# Patient Record
Sex: FEMALE | NOT HISPANIC OR LATINO | Employment: FULL TIME | URBAN - METROPOLITAN AREA
[De-identification: names, ages, dates, MRNs, and addresses within clinical notes are randomized per-mention and may not be internally consistent; named-entity substitution may affect disease eponyms.]

---

## 2024-04-22 ENCOUNTER — OFFICE VISIT (OUTPATIENT)
Dept: URGENT CARE | Facility: URGENT CARE | Age: 24
End: 2024-04-22

## 2024-04-22 VITALS
HEART RATE: 114 BPM | WEIGHT: 110.3 LBS | SYSTOLIC BLOOD PRESSURE: 110 MMHG | TEMPERATURE: 98.4 F | OXYGEN SATURATION: 100 % | DIASTOLIC BLOOD PRESSURE: 67 MMHG

## 2024-04-22 DIAGNOSIS — R10.31 ABDOMINAL PAIN, RIGHT LOWER QUADRANT: Primary | ICD-10-CM

## 2024-04-22 PROCEDURE — 99204 OFFICE O/P NEW MOD 45 MIN: CPT | Performed by: FAMILY MEDICINE

## 2024-05-14 ENCOUNTER — OFFICE VISIT (OUTPATIENT)
Dept: PEDIATRICS | Facility: CLINIC | Age: 24
End: 2024-05-14

## 2024-05-14 VITALS
WEIGHT: 110.3 LBS | TEMPERATURE: 98.5 F | BODY MASS INDEX: 20.3 KG/M2 | HEART RATE: 93 BPM | HEIGHT: 62 IN | RESPIRATION RATE: 16 BRPM | SYSTOLIC BLOOD PRESSURE: 95 MMHG | OXYGEN SATURATION: 98 % | DIASTOLIC BLOOD PRESSURE: 61 MMHG

## 2024-05-14 DIAGNOSIS — Z86.19 HISTORY OF HELICOBACTER PYLORI INFECTION: ICD-10-CM

## 2024-05-14 DIAGNOSIS — K55.1 SUPERIOR MESENTERIC ARTERY SYNDROME (H): ICD-10-CM

## 2024-05-14 DIAGNOSIS — Z09 HOSPITAL DISCHARGE FOLLOW-UP: Primary | ICD-10-CM

## 2024-05-14 PROCEDURE — 99214 OFFICE O/P EST MOD 30 MIN: CPT | Performed by: NURSE PRACTITIONER

## 2024-05-14 PROCEDURE — G2211 COMPLEX E/M VISIT ADD ON: HCPCS | Performed by: NURSE PRACTITIONER

## 2024-05-14 ASSESSMENT — PAIN SCALES - GENERAL: PAINLEVEL: SEVERE PAIN (7)

## 2024-05-14 NOTE — PROGRESS NOTES
Assessment & Plan     Hospital discharge follow-up  Superior mesenteric artery syndrome (H24)  History of Helicobacter pylori infection  Symptoms improved however still having pressure.  Continue with liquid diet and advance foods as tolerated.  Can refer to nutrition if needed.  Completed therapy for H.Pylori.  Started on omeprazole as patient felt better when being treated for H.Pylori.  Keep follow-up appointment with [facility] for 6/26/2024.  Work note given.  ER precautions given  - omeprazole (PRILOSEC) 20 MG DR capsule; Take 1 capsule (20 mg) by mouth 2 times daily      MED REC REQUIRED  Post Medication Reconciliation Status: discharge medications reconciled and changed, per note/orders      Subjective   Jael is a 23 year old, presenting for the following health issues:  Hospital F/U (Urinary retention )      5/14/2024     2:30 PM   Additional Questions   Roomed by Samira GALEANA   Accompanied by Self         5/14/2024     2:30 PM   Patient Reported Additional Medications   Patient reports taking the following new medications No     HPI     Hospital Follow-up Visit:    Hospital/Nursing Home/IP Rehab Facility: [facility].  Date of Admission: 4/22/24  Date of Discharge: 4/26/24  Reason(s) for Admission: Urinary retention   Superior mesenteric artery syndrome (HC) (Primary Dx);   Nausea and vomiting, unspecified vomiting type;   Diarrhea, unspecified type;   Abdominal pain, unspecified abdominal location;   Helicobacter pylori infection;   SMAS (superior mesenteric artery syndrome)   Was the patient in the ICU or did the patient experience delirium during hospitalization?  No  Do you have any other stressors you would like to discuss with your provider? No  Problems taking medications regularly:  None  Medication changes since discharge: Yes  Problems adhering to non-medication therapy:  None    Summary of hospitalization:  CareEverywhere information obtained and reviewed  Diagnostic  "Tests/Treatments reviewed.    Follow up needed: PCP follow-up and [facility] follow-up schedule for June 26th.  Other Healthcare Providers Involved in Patient s Care:         None  Update since discharge: improved.   Plan of care communicated with patient       Completed treatment for H Pylori. Patient was feeling better while on medications.    Still feels a lot of pressure in abdomen and \"bubbles\" intermittently.  This happens mostly when she is active but can happen at rest. States when she passes gas she can also feel stomach pain. Has only been consuming a liquid diet.  Felt better when she was on medication for H. Pylori.      Needs a work note.  Has been missing work due to illness.    Review of Systems  Constitutional, HEENT, cardiovascular, pulmonary, gi and gu systems are negative, except as otherwise noted.      Objective    BP 95/61 (BP Location: Right arm, Patient Position: Sitting, Cuff Size: Adult Regular)   Pulse 93   Temp 98.5  F (36.9  C) (Tympanic)   Resp 16   Ht 1.581 m (5' 2.24\")   Wt 50 kg (110 lb 4.8 oz)   LMP 04/20/2024 (Approximate)   SpO2 98%   BMI 20.02 kg/m    Body mass index is 20.02 kg/m .    Physical Exam   GENERAL: alert and no distress  RESP: lungs clear to auscultation - no rales, rhonchi or wheezes  CV: regular rate and rhythm, normal S1 S2, no S3 or S4, no murmur, click or rub, no peripheral edema  ABDOMEN: tenderness epigastric and RUQ and bowel sounds normal  PSYCH: mentation appears normal, affect normal/bright          Signed Electronically by: Dasia Travis NP    "

## 2024-05-14 NOTE — LETTER
May 14, 2024      Jael Boateng  [address]  [facility] [address]        To Whom It May Concern:    Jael Boateng was seen in our clinic.  Please excuse her from work until Monday, July 1, 2024.       Sincerely,      Dasia Travis

## 2024-05-14 NOTE — PATIENT INSTRUCTIONS
It was nice seeing you today.    [facility]  Central Scheduling Phone: [phone]  Estelle: [phone]  [address]  [address]Clinic) / [address] ([facility])  [place] [id number]      Please let me know if you have any questions regarding today's visit!    Take care,    TENISHA Travis, LAVON  Family Medicine

## 2024-05-17 ENCOUNTER — TRANSFERRED RECORDS (OUTPATIENT)
Dept: HEALTH INFORMATION MANAGEMENT | Facility: CLINIC | Age: 24
End: 2024-05-17

## 2024-06-10 ENCOUNTER — TELEPHONE (OUTPATIENT)
Dept: PEDIATRICS | Facility: CLINIC | Age: 24
End: 2024-06-10

## 2024-06-10 NOTE — TELEPHONE ENCOUNTER
Forms/Letter Request    Type of form/letter: OTHER: [facility] INSURANCE       Do we have the form/letter: Yes:     Who is the form from? Patient    Where did/will the form come from? Patient or family brought in       When is form/letter needed by: 06/11/24    How would you like the form/letter returned:     Patient Notified form requests are processed in 5-7 business days:Yes    Okay to leave a detailed message?: Yes at Cell number on file:    Telephone Information:   Mobile [phone]      Form placed in provider's folder     Katalina Rico on 6/10/2024 at 3:22 PM

## 2024-06-12 NOTE — TELEPHONE ENCOUNTER
Forms found and completed by provider. Copy sent to abstract. Pt given form in clinic.      Kimberlee GALEANA CMA

## 2024-06-26 ENCOUNTER — TRANSFERRED RECORDS (OUTPATIENT)
Dept: HEALTH INFORMATION MANAGEMENT | Facility: CLINIC | Age: 24
End: 2024-06-26

## 2024-07-28 ENCOUNTER — HEALTH MAINTENANCE LETTER (OUTPATIENT)
Age: 24
End: 2024-07-28

## 2024-09-12 ENCOUNTER — APPOINTMENT (OUTPATIENT)
Dept: CT IMAGING | Facility: CLINIC | Age: 24
End: 2024-09-12
Attending: EMERGENCY MEDICINE

## 2024-09-12 ENCOUNTER — HOSPITAL ENCOUNTER (OUTPATIENT)
Facility: CLINIC | Age: 24
Setting detail: OBSERVATION
Discharge: HOME | End: 2024-09-13
Attending: EMERGENCY MEDICINE | Admitting: HOSPITALIST
Payer: COMMERCIAL

## 2024-09-12 DIAGNOSIS — R11.2 CANNABINOID HYPEREMESIS SYNDROME: Primary | ICD-10-CM

## 2024-09-12 DIAGNOSIS — E87.6 HYPOKALEMIA: ICD-10-CM

## 2024-09-12 DIAGNOSIS — K21.9 GASTROESOPHAGEAL REFLUX DISEASE WITHOUT ESOPHAGITIS: ICD-10-CM

## 2024-09-12 DIAGNOSIS — K59.03 DRUG-INDUCED CONSTIPATION: ICD-10-CM

## 2024-09-12 DIAGNOSIS — F12.90 CANNABINOID HYPEREMESIS SYNDROME: Primary | ICD-10-CM

## 2024-09-12 DIAGNOSIS — R11.2 NAUSEA AND VOMITING, UNSPECIFIED VOMITING TYPE: ICD-10-CM

## 2024-09-12 LAB
ALBUMIN SERPL BCG-MCNC: 4.7 G/DL (ref 3.5–5.2)
ALP SERPL-CCNC: 53 U/L (ref 40–150)
ALT SERPL W P-5'-P-CCNC: 19 U/L (ref 0–50)
AMPHETAMINES UR QL SCN: ABNORMAL
ANION GAP SERPL CALCULATED.3IONS-SCNC: 17 MMOL/L (ref 7–15)
AST SERPL W P-5'-P-CCNC: 18 U/L (ref 0–45)
ATRIAL RATE - MUSE: 88 BPM
BARBITURATES UR QL SCN: ABNORMAL
BASOPHILS # BLD AUTO: 0 10E3/UL (ref 0–0.2)
BASOPHILS NFR BLD AUTO: 0 %
BENZODIAZ UR QL SCN: ABNORMAL
BILIRUB SERPL-MCNC: 0.6 MG/DL
BUN SERPL-MCNC: 6.4 MG/DL (ref 6–20)
BZE UR QL SCN: ABNORMAL
CALCIUM SERPL-MCNC: 9.4 MG/DL (ref 8.8–10.4)
CANNABINOIDS UR QL SCN: ABNORMAL
CHLORIDE SERPL-SCNC: 94 MMOL/L (ref 98–107)
CREAT SERPL-MCNC: 0.69 MG/DL (ref 0.51–0.95)
DIASTOLIC BLOOD PRESSURE - MUSE: NORMAL MMHG
EGFRCR SERPLBLD CKD-EPI 2021: >90 ML/MIN/1.73M2
EOSINOPHIL # BLD AUTO: 0 10E3/UL (ref 0–0.7)
EOSINOPHIL NFR BLD AUTO: 0 %
ERYTHROCYTE [DISTWIDTH] IN BLOOD BY AUTOMATED COUNT: 11.8 % (ref 10–15)
FENTANYL UR QL: ABNORMAL
GLUCOSE SERPL-MCNC: 113 MG/DL (ref 70–99)
HCG SERPL QL: NEGATIVE
HCO3 SERPL-SCNC: 23 MMOL/L (ref 22–29)
HCT VFR BLD AUTO: 41.4 % (ref 35–47)
HGB BLD-MCNC: 14.8 G/DL (ref 11.7–15.7)
HOLD SPECIMEN: NORMAL
IMM GRANULOCYTES # BLD: 0 10E3/UL
IMM GRANULOCYTES NFR BLD: 0 %
INTERPRETATION ECG - MUSE: NORMAL
LYMPHOCYTES # BLD AUTO: 1.5 10E3/UL (ref 0.8–5.3)
LYMPHOCYTES NFR BLD AUTO: 26 %
MAGNESIUM SERPL-MCNC: 2 MG/DL (ref 1.7–2.3)
MCH RBC QN AUTO: 30 PG (ref 26.5–33)
MCHC RBC AUTO-ENTMCNC: 35.7 G/DL (ref 31.5–36.5)
MCV RBC AUTO: 84 FL (ref 78–100)
MONOCYTES # BLD AUTO: 0.4 10E3/UL (ref 0–1.3)
MONOCYTES NFR BLD AUTO: 7 %
NEUTROPHILS # BLD AUTO: 3.8 10E3/UL (ref 1.6–8.3)
NEUTROPHILS NFR BLD AUTO: 67 %
NRBC # BLD AUTO: 0 10E3/UL
NRBC BLD AUTO-RTO: 0 /100
OPIATES UR QL SCN: ABNORMAL
P AXIS - MUSE: 71 DEGREES
PCP QUAL URINE (ROCHE): ABNORMAL
PHOSPHATE SERPL-MCNC: 1.1 MG/DL (ref 2.5–4.5)
PHOSPHATE SERPL-MCNC: 3 MG/DL (ref 2.5–4.5)
PLATELET # BLD AUTO: 158 10E3/UL (ref 150–450)
POTASSIUM SERPL-SCNC: 2.4 MMOL/L (ref 3.4–5.3)
POTASSIUM SERPL-SCNC: 3.2 MMOL/L (ref 3.4–5.3)
POTASSIUM SERPL-SCNC: 3.5 MMOL/L (ref 3.4–5.3)
PR INTERVAL - MUSE: 160 MS
PROT SERPL-MCNC: 7.9 G/DL (ref 6.4–8.3)
QRS DURATION - MUSE: 68 MS
QT - MUSE: 388 MS
QTC - MUSE: 469 MS
R AXIS - MUSE: 56 DEGREES
RBC # BLD AUTO: 4.94 10E6/UL (ref 3.8–5.2)
SODIUM SERPL-SCNC: 134 MMOL/L (ref 135–145)
SYSTOLIC BLOOD PRESSURE - MUSE: NORMAL MMHG
T AXIS - MUSE: 46 DEGREES
VENTRICULAR RATE- MUSE: 88 BPM
WBC # BLD AUTO: 5.8 10E3/UL (ref 4–11)

## 2024-09-12 PROCEDURE — 96376 TX/PRO/DX INJ SAME DRUG ADON: CPT | Mod: 59

## 2024-09-12 PROCEDURE — 250N000011 HC RX IP 250 OP 636: Performed by: EMERGENCY MEDICINE

## 2024-09-12 PROCEDURE — 74174 CTA ABD&PLVS W/CONTRAST: CPT

## 2024-09-12 PROCEDURE — 99223 1ST HOSP IP/OBS HIGH 75: CPT | Performed by: HOSPITALIST

## 2024-09-12 PROCEDURE — G0378 HOSPITAL OBSERVATION PER HR: HCPCS

## 2024-09-12 PROCEDURE — 96375 TX/PRO/DX INJ NEW DRUG ADDON: CPT | Mod: 59

## 2024-09-12 PROCEDURE — 84703 CHORIONIC GONADOTROPIN ASSAY: CPT | Performed by: EMERGENCY MEDICINE

## 2024-09-12 PROCEDURE — 36415 COLL VENOUS BLD VENIPUNCTURE: CPT | Performed by: HOSPITALIST

## 2024-09-12 PROCEDURE — 99285 EMERGENCY DEPT VISIT HI MDM: CPT | Mod: 25

## 2024-09-12 PROCEDURE — 80053 COMPREHEN METABOLIC PANEL: CPT | Performed by: EMERGENCY MEDICINE

## 2024-09-12 PROCEDURE — 250N000009 HC RX 250: Performed by: EMERGENCY MEDICINE

## 2024-09-12 PROCEDURE — 84132 ASSAY OF SERUM POTASSIUM: CPT | Performed by: HOSPITALIST

## 2024-09-12 PROCEDURE — 36415 COLL VENOUS BLD VENIPUNCTURE: CPT | Performed by: EMERGENCY MEDICINE

## 2024-09-12 PROCEDURE — 84100 ASSAY OF PHOSPHORUS: CPT | Performed by: HOSPITALIST

## 2024-09-12 PROCEDURE — 83735 ASSAY OF MAGNESIUM: CPT | Performed by: EMERGENCY MEDICINE

## 2024-09-12 PROCEDURE — 250N000013 HC RX MED GY IP 250 OP 250 PS 637: Performed by: EMERGENCY MEDICINE

## 2024-09-12 PROCEDURE — 96366 THER/PROPH/DIAG IV INF ADDON: CPT

## 2024-09-12 PROCEDURE — 250N000011 HC RX IP 250 OP 636: Performed by: HOSPITALIST

## 2024-09-12 PROCEDURE — 93005 ELECTROCARDIOGRAM TRACING: CPT

## 2024-09-12 PROCEDURE — 250N000009 HC RX 250: Performed by: HOSPITALIST

## 2024-09-12 PROCEDURE — 80307 DRUG TEST PRSMV CHEM ANLYZR: CPT | Performed by: HOSPITALIST

## 2024-09-12 PROCEDURE — 96361 HYDRATE IV INFUSION ADD-ON: CPT

## 2024-09-12 PROCEDURE — 250N000013 HC RX MED GY IP 250 OP 250 PS 637: Performed by: HOSPITALIST

## 2024-09-12 PROCEDURE — 96365 THER/PROPH/DIAG IV INF INIT: CPT

## 2024-09-12 PROCEDURE — 258N000003 HC RX IP 258 OP 636: Performed by: EMERGENCY MEDICINE

## 2024-09-12 PROCEDURE — 85025 COMPLETE CBC W/AUTO DIFF WBC: CPT | Performed by: EMERGENCY MEDICINE

## 2024-09-12 RX ORDER — PROCHLORPERAZINE 25 MG
25 SUPPOSITORY, RECTAL RECTAL EVERY 12 HOURS PRN
Status: DISCONTINUED | OUTPATIENT
Start: 2024-09-12 | End: 2024-09-13 | Stop reason: HOSPADM

## 2024-09-12 RX ORDER — ONDANSETRON 2 MG/ML
4 INJECTION INTRAMUSCULAR; INTRAVENOUS ONCE
Status: COMPLETED | OUTPATIENT
Start: 2024-09-12 | End: 2024-09-12

## 2024-09-12 RX ORDER — PROCHLORPERAZINE MALEATE 5 MG
10 TABLET ORAL EVERY 6 HOURS PRN
Status: DISCONTINUED | OUTPATIENT
Start: 2024-09-12 | End: 2024-09-13 | Stop reason: HOSPADM

## 2024-09-12 RX ORDER — HYDROMORPHONE HCL IN WATER/PF 6 MG/30 ML
0.2 PATIENT CONTROLLED ANALGESIA SYRINGE INTRAVENOUS 2 TIMES DAILY PRN
Status: DISCONTINUED | OUTPATIENT
Start: 2024-09-12 | End: 2024-09-13

## 2024-09-12 RX ORDER — POTASSIUM CHLORIDE 1.5 G/1.58G
40 POWDER, FOR SOLUTION ORAL ONCE
Status: COMPLETED | OUTPATIENT
Start: 2024-09-12 | End: 2024-09-12

## 2024-09-12 RX ORDER — POTASSIUM CHLORIDE 7.45 MG/ML
10 INJECTION INTRAVENOUS ONCE
Status: COMPLETED | OUTPATIENT
Start: 2024-09-12 | End: 2024-09-12

## 2024-09-12 RX ORDER — AMOXICILLIN 250 MG
1 CAPSULE ORAL 2 TIMES DAILY PRN
Status: DISCONTINUED | OUTPATIENT
Start: 2024-09-12 | End: 2024-09-13 | Stop reason: HOSPADM

## 2024-09-12 RX ORDER — NALOXONE HYDROCHLORIDE 0.4 MG/ML
0.4 INJECTION, SOLUTION INTRAMUSCULAR; INTRAVENOUS; SUBCUTANEOUS
Status: DISCONTINUED | OUTPATIENT
Start: 2024-09-12 | End: 2024-09-13 | Stop reason: HOSPADM

## 2024-09-12 RX ORDER — ONDANSETRON 4 MG/1
4 TABLET, ORALLY DISINTEGRATING ORAL EVERY 6 HOURS PRN
Status: DISCONTINUED | OUTPATIENT
Start: 2024-09-12 | End: 2024-09-13 | Stop reason: HOSPADM

## 2024-09-12 RX ORDER — NALOXONE HYDROCHLORIDE 0.4 MG/ML
0.2 INJECTION, SOLUTION INTRAMUSCULAR; INTRAVENOUS; SUBCUTANEOUS
Status: DISCONTINUED | OUTPATIENT
Start: 2024-09-12 | End: 2024-09-13 | Stop reason: HOSPADM

## 2024-09-12 RX ORDER — POTASSIUM CHLORIDE 1500 MG/1
20 TABLET, EXTENDED RELEASE ORAL ONCE
Status: COMPLETED | OUTPATIENT
Start: 2024-09-12 | End: 2024-09-12

## 2024-09-12 RX ORDER — IOPAMIDOL 755 MG/ML
500 INJECTION, SOLUTION INTRAVASCULAR ONCE
Status: COMPLETED | OUTPATIENT
Start: 2024-09-12 | End: 2024-09-12

## 2024-09-12 RX ORDER — METOCLOPRAMIDE HYDROCHLORIDE 5 MG/ML
10 INJECTION INTRAMUSCULAR; INTRAVENOUS EVERY 6 HOURS
Status: DISCONTINUED | OUTPATIENT
Start: 2024-09-12 | End: 2024-09-13 | Stop reason: HOSPADM

## 2024-09-12 RX ORDER — METOCLOPRAMIDE 10 MG/1
10 TABLET ORAL EVERY 6 HOURS PRN
COMMUNITY

## 2024-09-12 RX ORDER — FENTANYL CITRATE 50 UG/ML
50 INJECTION, SOLUTION INTRAMUSCULAR; INTRAVENOUS ONCE
Status: COMPLETED | OUTPATIENT
Start: 2024-09-12 | End: 2024-09-12

## 2024-09-12 RX ORDER — OXYCODONE HYDROCHLORIDE 5 MG/1
5 TABLET ORAL EVERY 4 HOURS PRN
Status: DISCONTINUED | OUTPATIENT
Start: 2024-09-12 | End: 2024-09-13 | Stop reason: HOSPADM

## 2024-09-12 RX ORDER — MAGNESIUM HYDROXIDE/ALUMINUM HYDROXICE/SIMETHICONE 120; 1200; 1200 MG/30ML; MG/30ML; MG/30ML
15 SUSPENSION ORAL 3 TIMES DAILY PRN
Status: DISCONTINUED | OUTPATIENT
Start: 2024-09-12 | End: 2024-09-13 | Stop reason: HOSPADM

## 2024-09-12 RX ORDER — ONDANSETRON 4 MG/1
4 TABLET, ORALLY DISINTEGRATING ORAL 2 TIMES DAILY
COMMUNITY

## 2024-09-12 RX ORDER — ONDANSETRON 2 MG/ML
4 INJECTION INTRAMUSCULAR; INTRAVENOUS EVERY 6 HOURS PRN
Status: DISCONTINUED | OUTPATIENT
Start: 2024-09-12 | End: 2024-09-13 | Stop reason: HOSPADM

## 2024-09-12 RX ORDER — AMOXICILLIN 250 MG
2 CAPSULE ORAL 2 TIMES DAILY PRN
Status: DISCONTINUED | OUTPATIENT
Start: 2024-09-12 | End: 2024-09-13 | Stop reason: HOSPADM

## 2024-09-12 RX ORDER — CAPSAICIN 0.025 %
CREAM (GRAM) TOPICAL 3 TIMES DAILY
Status: DISCONTINUED | OUTPATIENT
Start: 2024-09-12 | End: 2024-09-13 | Stop reason: HOSPADM

## 2024-09-12 RX ORDER — SODIUM CHLORIDE AND POTASSIUM CHLORIDE 150; 900 MG/100ML; MG/100ML
INJECTION, SOLUTION INTRAVENOUS CONTINUOUS
Status: DISCONTINUED | OUTPATIENT
Start: 2024-09-12 | End: 2024-09-13 | Stop reason: HOSPADM

## 2024-09-12 RX ORDER — PANTOPRAZOLE SODIUM 40 MG/1
40 TABLET, DELAYED RELEASE ORAL
Status: DISCONTINUED | OUTPATIENT
Start: 2024-09-13 | End: 2024-09-13 | Stop reason: HOSPADM

## 2024-09-12 RX ADMIN — POTASSIUM CHLORIDE 10 MEQ: 7.46 INJECTION, SOLUTION INTRAVENOUS at 07:19

## 2024-09-12 RX ADMIN — ONDANSETRON 4 MG: 4 TABLET, ORALLY DISINTEGRATING ORAL at 13:58

## 2024-09-12 RX ADMIN — METOCLOPRAMIDE 10 MG: 5 INJECTION, SOLUTION INTRAMUSCULAR; INTRAVENOUS at 13:44

## 2024-09-12 RX ADMIN — POTASSIUM CHLORIDE 40 MEQ: 1.5 POWDER, FOR SOLUTION ORAL at 08:34

## 2024-09-12 RX ADMIN — ONDANSETRON 4 MG: 2 INJECTION INTRAMUSCULAR; INTRAVENOUS at 05:08

## 2024-09-12 RX ADMIN — POTASSIUM CHLORIDE AND SODIUM CHLORIDE: 900; 150 INJECTION, SOLUTION INTRAVENOUS at 08:34

## 2024-09-12 RX ADMIN — CAPSAICIN: 0.25 CREAM TOPICAL at 15:08

## 2024-09-12 RX ADMIN — FENTANYL CITRATE 50 MCG: 50 INJECTION, SOLUTION INTRAMUSCULAR; INTRAVENOUS at 07:19

## 2024-09-12 RX ADMIN — POTASSIUM CHLORIDE AND SODIUM CHLORIDE: 900; 150 INJECTION, SOLUTION INTRAVENOUS at 20:32

## 2024-09-12 RX ADMIN — SODIUM CHLORIDE 80 ML: 9 INJECTION, SOLUTION INTRAVENOUS at 06:49

## 2024-09-12 RX ADMIN — SODIUM CHLORIDE 1000 ML: 9 INJECTION, SOLUTION INTRAVENOUS at 03:16

## 2024-09-12 RX ADMIN — SODIUM PHOSPHATE, MONOBASIC, MONOHYDRATE AND SODIUM PHOSPHATE, DIBASIC, ANHYDROUS 15 MMOL: 142; 276 INJECTION, SOLUTION INTRAVENOUS at 11:37

## 2024-09-12 RX ADMIN — OXYCODONE HYDROCHLORIDE 5 MG: 5 TABLET ORAL at 15:08

## 2024-09-12 RX ADMIN — CAPSAICIN: 0.25 CREAM TOPICAL at 20:32

## 2024-09-12 RX ADMIN — METOCLOPRAMIDE 10 MG: 5 INJECTION, SOLUTION INTRAMUSCULAR; INTRAVENOUS at 20:32

## 2024-09-12 RX ADMIN — ONDANSETRON 4 MG: 2 INJECTION INTRAMUSCULAR; INTRAVENOUS at 03:13

## 2024-09-12 RX ADMIN — IOPAMIDOL 72 ML: 755 INJECTION, SOLUTION INTRAVENOUS at 06:49

## 2024-09-12 RX ADMIN — PROCHLORPERAZINE EDISYLATE 10 MG: 5 INJECTION INTRAMUSCULAR; INTRAVENOUS at 10:46

## 2024-09-12 RX ADMIN — POTASSIUM CHLORIDE 10 MEQ: 7.46 INJECTION, SOLUTION INTRAVENOUS at 04:10

## 2024-09-12 RX ADMIN — POTASSIUM CHLORIDE 40 MEQ: 1.5 POWDER, FOR SOLUTION ORAL at 04:02

## 2024-09-12 RX ADMIN — METOCLOPRAMIDE 10 MG: 5 INJECTION, SOLUTION INTRAMUSCULAR; INTRAVENOUS at 08:34

## 2024-09-12 RX ADMIN — POTASSIUM CHLORIDE 20 MEQ: 1500 TABLET, EXTENDED RELEASE ORAL at 13:36

## 2024-09-12 ASSESSMENT — COLUMBIA-SUICIDE SEVERITY RATING SCALE - C-SSRS
2. HAVE YOU ACTUALLY HAD ANY THOUGHTS OF KILLING YOURSELF IN THE PAST MONTH?: NO
1. IN THE PAST MONTH, HAVE YOU WISHED YOU WERE DEAD OR WISHED YOU COULD GO TO SLEEP AND NOT WAKE UP?: NO
6. HAVE YOU EVER DONE ANYTHING, STARTED TO DO ANYTHING, OR PREPARED TO DO ANYTHING TO END YOUR LIFE?: NO

## 2024-09-12 ASSESSMENT — ACTIVITIES OF DAILY LIVING (ADL)
ADLS_ACUITY_SCORE: 31
ADLS_ACUITY_SCORE: 35
ADLS_ACUITY_SCORE: 31
ADLS_ACUITY_SCORE: 35
ADLS_ACUITY_SCORE: 31
ADLS_ACUITY_SCORE: 35
ADLS_ACUITY_SCORE: 31
ADLS_ACUITY_SCORE: 35
ADLS_ACUITY_SCORE: 31
ADLS_ACUITY_SCORE: 31
ADLS_ACUITY_SCORE: 35
ADLS_ACUITY_SCORE: 31

## 2024-09-12 NOTE — PHARMACY-ADMISSION MEDICATION HISTORY
Pharmacist Admission Medication History    Admission medication history is complete. The information provided in this note is only as accurate as the sources available at the time of the update.    Information Source(s): Patient, Hospital records, and CareEverywhere/SureScripts via in-person    Pertinent Information:      Changes made to PTA medication list:  Added: all  Deleted: None  Changed: None    Allergies reviewed with patient and updates made in EHR: yes    Medication History Completed By: Rio Grimes RPH 9/12/2024 11:58 AM    PTA Med List   Medication Sig Last Dose    metoclopramide (REGLAN) 10 MG tablet Take 10 mg by mouth every 6 hours as needed (nausea and vomiting.). 9/12/2024 at AM    ondansetron (ZOFRAN ODT) 4 MG ODT tab Take 4 mg by mouth 2 times daily. 9/12/2024 at AM

## 2024-09-12 NOTE — PROGRESS NOTES
ROOM # [id number]    Living Situation (if not independent, order SW consult):  Facility name:  : [facility] ([phone] [facility])    Activity level at baseline: independent   Activity level on admit: independent     Who will be transporting you at discharge: [facility] ([phone] [facility])    Patient registered to observation; given Patient Bill of Rights; given the opportunity to ask questions about observation status and their plan of care.  Patient has been oriented to the observation room, bathroom and call light is in place.    Discussed discharge goals and expectations with patient/family.

## 2024-09-12 NOTE — ED TRIAGE NOTES
Pt arrives for flare up of superior mesenteric artery syndrome. Was recently seen for same but hasn't resolved and started worsening. Generalized abdominal pain, nausea, decreased appetite. Took zofran around 1800 yesterday. AVSS on RA.

## 2024-09-12 NOTE — ED NOTES
PT had another episode of emesis, receiving RN notified and aware that the potassium was given and pt threw up, new timed K @ 1000.

## 2024-09-12 NOTE — ED PROVIDER NOTES
"  Emergency Department Note      History of Present Illness     Chief Complaint   Abdominal Pain and Nausea & Vomiting      HPI   Jael Boateng is a 23 year old female with a history of superior mesenteric artery syndrome who presents with abdominal pain, nausea, vomiting.  The patient has been seen twice at [facility].  She had a CT scan on September 9 that did not demonstrate a bowel obstruction.  Her symptoms were well-controlled and she went home.  She returns today with persistent vomiting.  She has been referred to see vascular surgery though it does not sound that any emergent intervention has been recommended.  She denies any fever.  No diarrhea.  No ill contacts.  No new medications.      Review of External Notes   [employer] notes reviewed from September 9 when the patient had a CT scan that did not demonstrate a bowel obstruction.    Past Medical History     Medical History and Problem List   SMAS (superior mesenteric artery syndrome)  Hypokalemia    Medications   Reglan  Zofran  Prilosec    Physical Exam     Patient Vitals for the past 24 hrs:   BP Temp Temp src Pulse Resp SpO2 Height Weight   09/12/24 0630 (!) 124/99 -- -- 64 12 100 % -- --   09/12/24 0600 (!) 132/91 -- -- 60 14 100 % -- --   09/12/24 0530 (!) 124/97 -- -- 66 12 93 % -- --   09/12/24 0500 119/79 -- -- 85 15 100 % -- --   09/12/24 0430 (!) 129/93 -- -- 89 17 99 % -- --   09/12/24 0400 (!) 134/91 -- -- 92 -- 100 % -- --   09/12/24 0306 136/86 98.7  F (37.1  C) Temporal 92 20 100 % 1.575 m (5' 2\") 49 kg (108 lb)     Physical Exam  Constitutional:       General: She is not in acute distress.     Appearance: Normal appearance. She is not diaphoretic.   HENT:      Head: Atraumatic.      Right Ear: External ear normal.      Left Ear: External ear normal.      Mouth/Throat:      Mouth: Mucous membranes are moist.   Eyes:      General: No scleral icterus.     Conjunctiva/sclera: Conjunctivae normal.   Cardiovascular:      Rate " and Rhythm: Normal rate and regular rhythm.      Heart sounds: Normal heart sounds.   Pulmonary:      Effort: No respiratory distress.      Breath sounds: Normal breath sounds.   Abdominal:      General: Abdomen is flat. There is no distension.      Comments: Mild epigastric tenderness without guarding or rebound.   Musculoskeletal:      Cervical back: Neck supple.      Right lower leg: No edema.      Left lower leg: No edema.   Skin:     General: Skin is warm.      Capillary Refill: Capillary refill takes less than 2 seconds.      Findings: No rash.   Neurological:      General: No focal deficit present.      Mental Status: She is alert and oriented to person, place, and time.   Psychiatric:         Mood and Affect: Mood normal.         Behavior: Behavior normal.           Diagnostics     Lab Results   Labs Ordered and Resulted from Time of ED Arrival to Time of ED Departure   COMPREHENSIVE METABOLIC PANEL - Abnormal       Result Value    Sodium 134 (*)     Potassium 2.4 (*)     Carbon Dioxide (CO2) 23      Anion Gap 17 (*)     Urea Nitrogen 6.4      Creatinine 0.69      GFR Estimate >90      Calcium 9.4      Chloride 94 (*)     Glucose 113 (*)     Alkaline Phosphatase 53      AST 18      ALT 19      Protein Total 7.9      Albumin 4.7      Bilirubin Total 0.6     MAGNESIUM - Normal    Magnesium 2.0     HCG QUALITATIVE PREGNANCY - Normal    hCG Serum Qualitative Negative     CBC WITH PLATELETS AND DIFFERENTIAL    WBC Count 5.8      RBC Count 4.94      Hemoglobin 14.8      Hematocrit 41.4      MCV 84      MCH 30.0      MCHC 35.7      RDW 11.8      Platelet Count 158      % Neutrophils 67      % Lymphocytes 26      % Monocytes 7      % Eosinophils 0      % Basophils 0      % Immature Granulocytes 0      NRBCs per 100 WBC 0      Absolute Neutrophils 3.8      Absolute Lymphocytes 1.5      Absolute Monocytes 0.4      Absolute Eosinophils 0.0      Absolute Basophils 0.0      Absolute Immature Granulocytes 0.0       Absolute NRBCs 0.0     POTASSIUM       Imaging   CTA Abdomen Pelvis with Contrast   Final Result   IMPRESSION:   1.  No acute findings to explain the patient's pain or vomiting.      2.  Similar acute aorta-mesenteric angle at 35 degrees which is lower range of normal and no signs of compromise to the duodenum. No signs of obstruction or edema.          EKG   ECG results from 09/12/24   EKG 12-lead, tracing only     Value    Systolic Blood Pressure     Diastolic Blood Pressure     Ventricular Rate 88    Atrial Rate 88    OR Interval 160    QRS Duration 68        QTc 469    P Axis 71    R AXIS 56    T Axis 46    Interpretation ECG      Sinus rhythm  T wave abnormality, consider anterior ischemia  No previous ECGs available  Interpreted by me at 0426         ED Course      Medications Administered   Medications   potassium chloride 10 mEq in 100 mL sterile water infusion (10 mEq Intravenous $New Bag 9/12/24 0719)   potassium chloride (KLOR-CON) Packet 40 mEq (has no administration in time range)   ondansetron (ZOFRAN) injection 4 mg (4 mg Intravenous $Given 9/12/24 0313)   sodium chloride 0.9% BOLUS 1,000 mL (0 mLs Intravenous Stopped 9/12/24 0418)   potassium chloride 10 mEq in 100 mL sterile water infusion (0 mEq Intravenous Stopped 9/12/24 0517)   potassium chloride (KLOR-CON) Packet 40 mEq (40 mEq Oral $Given 9/12/24 0402)   ondansetron (ZOFRAN) injection 4 mg (4 mg Intravenous $Given 9/12/24 0508)   iopamidol (ISOVUE-370) solution 500 mL (72 mLs Intravenous $Given 9/12/24 0649)   CT Scan Flush (80 mLs Intravenous $Given 9/12/24 0649)   fentaNYL (PF) (SUBLIMAZE) injection 50 mcg (50 mcg Intravenous $Given 9/12/24 0719)     Discussion of Management   Admitting Hospitalist, Dr Mcnamara    ED Course   ED Course as of 09/12/24 0751   Thu Sep 12, 2024   0349 I obtained history and examined the patient as noted above       Medical Decision Making / Diagnosis     MDM   Jael Boateng is a 23 year old  female who presents to the ED with persistent vomiting.  She has a history of SMA syndrome although she does not appear to be obstructed on her CT scan today either.  Is not clear whether she may also have a history of gastroparesis or cyclic vomiting.  However, she was vomiting point but she is significantly hypokalemic.  Admission was requested.    The patient was given IV potassium.  She vomited after the first dose of oral potassium.  We will give another 10 mill equivalents IV and then retry oral potassium.    Disposition   The patient was admitted to the hospital.     Diagnosis     ICD-10-CM    1. Nausea and vomiting, unspecified vomiting type  R11.2       2. Hypokalemia  E87.6            Scribe Disclosure:  IAce, am serving as a scribe at 5:56 AM on 9/12/2024 to document services personally performed by Kiko Edwards MD based on my observations and the provider's statements to me.        Kiko Edwards MD  09/12/24 0754

## 2024-09-13 VITALS
OXYGEN SATURATION: 100 % | SYSTOLIC BLOOD PRESSURE: 111 MMHG | WEIGHT: 108 LBS | HEART RATE: 64 BPM | BODY MASS INDEX: 19.88 KG/M2 | DIASTOLIC BLOOD PRESSURE: 71 MMHG | RESPIRATION RATE: 18 BRPM | HEIGHT: 62 IN | TEMPERATURE: 98.7 F

## 2024-09-13 LAB
ANION GAP SERPL CALCULATED.3IONS-SCNC: 11 MMOL/L (ref 7–15)
BASOPHILS # BLD AUTO: 0 10E3/UL (ref 0–0.2)
BASOPHILS NFR BLD AUTO: 0 %
BUN SERPL-MCNC: 2.5 MG/DL (ref 6–20)
CALCIUM SERPL-MCNC: 8.8 MG/DL (ref 8.8–10.4)
CHLORIDE SERPL-SCNC: 108 MMOL/L (ref 98–107)
CREAT SERPL-MCNC: 0.62 MG/DL (ref 0.51–0.95)
EGFRCR SERPLBLD CKD-EPI 2021: >90 ML/MIN/1.73M2
EOSINOPHIL # BLD AUTO: 0.5 10E3/UL (ref 0–0.7)
EOSINOPHIL NFR BLD AUTO: 7 %
ERYTHROCYTE [DISTWIDTH] IN BLOOD BY AUTOMATED COUNT: 12.3 % (ref 10–15)
GLUCOSE SERPL-MCNC: 106 MG/DL (ref 70–99)
HCO3 SERPL-SCNC: 23 MMOL/L (ref 22–29)
HCT VFR BLD AUTO: 38.3 % (ref 35–47)
HGB BLD-MCNC: 12.4 G/DL (ref 11.7–15.7)
IMM GRANULOCYTES # BLD: 0 10E3/UL
IMM GRANULOCYTES NFR BLD: 0 %
LYMPHOCYTES # BLD AUTO: 2.3 10E3/UL (ref 0.8–5.3)
LYMPHOCYTES NFR BLD AUTO: 35 %
MCH RBC QN AUTO: 29.5 PG (ref 26.5–33)
MCHC RBC AUTO-ENTMCNC: 32.4 G/DL (ref 31.5–36.5)
MCV RBC AUTO: 91 FL (ref 78–100)
MONOCYTES # BLD AUTO: 0.5 10E3/UL (ref 0–1.3)
MONOCYTES NFR BLD AUTO: 7 %
NEUTROPHILS # BLD AUTO: 3.4 10E3/UL (ref 1.6–8.3)
NEUTROPHILS NFR BLD AUTO: 51 %
NRBC # BLD AUTO: 0 10E3/UL
NRBC BLD AUTO-RTO: 0 /100
PHOSPHATE SERPL-MCNC: 2.9 MG/DL (ref 2.5–4.5)
PLATELET # BLD AUTO: 138 10E3/UL (ref 150–450)
POTASSIUM SERPL-SCNC: 3.8 MMOL/L (ref 3.4–5.3)
POTASSIUM SERPL-SCNC: 3.8 MMOL/L (ref 3.4–5.3)
RBC # BLD AUTO: 4.2 10E6/UL (ref 3.8–5.2)
SODIUM SERPL-SCNC: 142 MMOL/L (ref 135–145)
WBC # BLD AUTO: 6.7 10E3/UL (ref 4–11)

## 2024-09-13 PROCEDURE — 99239 HOSP IP/OBS DSCHRG MGMT >30: CPT | Performed by: HOSPITALIST

## 2024-09-13 PROCEDURE — 84100 ASSAY OF PHOSPHORUS: CPT | Performed by: HOSPITALIST

## 2024-09-13 PROCEDURE — 85025 COMPLETE CBC W/AUTO DIFF WBC: CPT | Performed by: HOSPITALIST

## 2024-09-13 PROCEDURE — 36415 COLL VENOUS BLD VENIPUNCTURE: CPT | Performed by: HOSPITALIST

## 2024-09-13 PROCEDURE — G0378 HOSPITAL OBSERVATION PER HR: HCPCS

## 2024-09-13 PROCEDURE — 96376 TX/PRO/DX INJ SAME DRUG ADON: CPT

## 2024-09-13 PROCEDURE — 250N000013 HC RX MED GY IP 250 OP 250 PS 637: Performed by: HOSPITALIST

## 2024-09-13 PROCEDURE — 80048 BASIC METABOLIC PNL TOTAL CA: CPT | Performed by: HOSPITALIST

## 2024-09-13 PROCEDURE — 250N000011 HC RX IP 250 OP 636: Performed by: HOSPITALIST

## 2024-09-13 RX ORDER — PROCHLORPERAZINE MALEATE 10 MG
5 TABLET ORAL EVERY 6 HOURS PRN
Qty: 20 TABLET | Refills: 0 | Status: SHIPPED | OUTPATIENT
Start: 2024-09-13

## 2024-09-13 RX ORDER — AMOXICILLIN 250 MG
1 CAPSULE ORAL 2 TIMES DAILY PRN
Qty: 30 TABLET | Refills: 0 | Status: SHIPPED | OUTPATIENT
Start: 2024-09-13

## 2024-09-13 RX ORDER — PANTOPRAZOLE SODIUM 40 MG/1
40 TABLET, DELAYED RELEASE ORAL
Qty: 30 TABLET | Refills: 0 | Status: SHIPPED | OUTPATIENT
Start: 2024-09-14

## 2024-09-13 RX ORDER — OXYCODONE HYDROCHLORIDE 5 MG/1
5 TABLET ORAL EVERY 6 HOURS PRN
Qty: 15 TABLET | Refills: 0 | Status: SHIPPED | OUTPATIENT
Start: 2024-09-13

## 2024-09-13 RX ORDER — CAPSAICIN 0.025 %
1 CREAM (GRAM) TOPICAL 3 TIMES DAILY PRN
Qty: 25 G | Refills: 0 | Status: SHIPPED | OUTPATIENT
Start: 2024-09-13

## 2024-09-13 RX ADMIN — PANTOPRAZOLE SODIUM 40 MG: 40 TABLET, DELAYED RELEASE ORAL at 07:59

## 2024-09-13 RX ADMIN — POTASSIUM CHLORIDE AND SODIUM CHLORIDE: 900; 150 INJECTION, SOLUTION INTRAVENOUS at 07:08

## 2024-09-13 RX ADMIN — METOCLOPRAMIDE 10 MG: 5 INJECTION, SOLUTION INTRAMUSCULAR; INTRAVENOUS at 03:08

## 2024-09-13 RX ADMIN — METOCLOPRAMIDE 10 MG: 5 INJECTION, SOLUTION INTRAMUSCULAR; INTRAVENOUS at 07:59

## 2024-09-13 RX ADMIN — CAPSAICIN: 0.25 CREAM TOPICAL at 07:59

## 2024-09-13 ASSESSMENT — ACTIVITIES OF DAILY LIVING (ADL)
ADLS_ACUITY_SCORE: 31

## 2024-09-13 NOTE — PLAN OF CARE
"PRIMARY DIAGNOSIS: ABDOMINAL PAIN, NAUSEA & VOMITING  OUTPATIENT/OBSERVATION GOALS TO BE MET BEFORE DISCHARGE:  1. Pain Status: Improved-controlled with oral pain medications.    2. Return to near baseline physical activity: No    3. Cleared for discharge by consultants (if involved): No    Discharge Planner Nurse   Safe discharge environment identified: Yes  Barriers to discharge: Yes       Entered by: Jael Asif RN 09/13/2024   /79 (BP Location: Right arm)   Pulse 58   Temp 98.8  F (37.1  C) (Oral)   Resp 16   Ht 1.575 m (5' 2\")   Wt 49 kg (108 lb)   SpO2 98%   BMI 19.75 kg/m        Please review provider order for any additional goals.   Nurse to notify provider when observation goals have been met and patient is ready for discharge.  "

## 2024-09-13 NOTE — PLAN OF CARE
"PRIMARY DIAGNOSIS: ABDOMINAL PAIN, NAUSEA & VOMITING  OUTPATIENT/OBSERVATION GOALS TO BE MET BEFORE DISCHARGE:  1. Pain Status: Improved-controlled with oral pain medications.    2. Return to near baseline physical activity: Yes    3. Cleared for discharge by consultants (if involved): No    Discharge Planner Nurse   Safe discharge environment identified: Yes  Barriers to discharge: Yes       Entered by: Jael Asif RN 09/13/2024   /80 (BP Location: Right arm)   Pulse 70   Temp 98.4  F (36.9  C) (Oral)   Resp 18   Ht 1.575 m (5' 2\")   Wt 49 kg (108 lb)   SpO2 100%   BMI 19.75 kg/m      Alert & oriented x 4. VSS. Independent in room.denies pain. IVF infusing. Has been up most of the night. Significant other @bedside.  Please review provider order for any additional goals.   Nurse to notify provider when observation goals have been met and patient is ready for discharge.Goal Outcome Evaluation:      Plan of Care Reviewed With: patient    Overall Patient Progress: improvingOverall Patient Progress: improving           "

## 2024-09-13 NOTE — PLAN OF CARE
"PRIMARY DIAGNOSIS: Abdominal Pain  OUTPATIENT/OBSERVATION GOALS TO BE MET BEFORE DISCHARGE:  ADLs back to baseline: Yes    Activity and level of assistance: Ambulating independently.    Pain status: Pain free.    Return to near baseline physical activity: Yes     Discharge Planner Nurse   Safe discharge environment identified: Yes  Barriers to discharge: No       Entered by: Savanna Wiley RN 09/13/2024 8:44 AM   Denies pain. Tolerating diet. AxOx4. Independent in room. +BM.   Please review provider order for any additional goals.   Nurse to notify provider when observation goals have been met and patient is ready for discharge.Goal Outcome Evaluation:      Plan of Care Reviewed With: patient, significant other    Overall Patient Progress: improvingOverall Patient Progress: improving    Outcome Evaluation: denies pain, tolerating diet      Problem: Adult Inpatient Plan of Care  Goal: Plan of Care Review  Description: The Plan of Care Review/Shift note should be completed every shift.  The Outcome Evaluation is a brief statement about your assessment that the patient is improving, declining, or no change.  This information will be displayed automatically on your shift  note.  Outcome: Progressing  Flowsheets (Taken 9/13/2024 0843)  Outcome Evaluation: denies pain, tolerating diet  Plan of Care Reviewed With:   patient   significant other  Overall Patient Progress: improving  Goal: Patient-Specific Goal (Individualized)  Description: You can add care plan individualizations to a care plan. Examples of Individualization might be:  \"Parent requests to be called daily at 9am for status\", \"I have a hard time hearing out of my right ear\", or \"Do not touch me to wake me up as it startles  me\".  Outcome: Progressing  Goal: Absence of Hospital-Acquired Illness or Injury  Outcome: Progressing  Intervention: Identify and Manage Fall Risk  Recent Flowsheet Documentation  Taken 9/13/2024 0800 by Savanna Wiley RN  Safety " Promotion/Fall Prevention:   treat underlying cause   treat reversible contributory factors   safety round/check completed   room organization consistent   patient and family education   nonskid shoes/slippers when out of bed   clutter free environment maintained  Intervention: Prevent Skin Injury  Recent Flowsheet Documentation  Taken 9/13/2024 0700 by Savanna Wiley RN  Body Position:   position changed independently   sitting up in bed  Goal: Optimal Comfort and Wellbeing  Outcome: Progressing  Goal: Readiness for Transition of Care  Outcome: Progressing

## 2024-09-13 NOTE — PLAN OF CARE
"Goal Outcome Evaluation:      Plan of Care Reviewed With: patient    Overall Patient Progress: improvingOverall Patient Progress: improving    Outcome Evaluation: PT a/o x4, no c/o pain, went for shower, IVF infusing, family bedside. Indep.      Problem: Adult Inpatient Plan of Care  Goal: Plan of Care Review  Description: The Plan of Care Review/Shift note should be completed every shift.  The Outcome Evaluation is a brief statement about your assessment that the patient is improving, declining, or no change.  This information will be displayed automatically on your shift  note.  Outcome: Progressing  Flowsheets (Taken 9/12/2024 1936)  Outcome Evaluation: PT a/o x4, no c/o pain, went for shower, IVF infusing, family bedside. Indep.  Plan of Care Reviewed With: patient  Overall Patient Progress: improving  Goal: Patient-Specific Goal (Individualized)  Description: You can add care plan individualizations to a care plan. Examples of Individualization might be:  \"Parent requests to be called daily at 9am for status\", \"I have a hard time hearing out of my right ear\", or \"Do not touch me to wake me up as it startles  me\".  Outcome: Progressing  Goal: Absence of Hospital-Acquired Illness or Injury  Outcome: Progressing  Intervention: Identify and Manage Fall Risk  Recent Flowsheet Documentation  Taken 9/12/2024 1600 by Divya Rodriguez RN  Safety Promotion/Fall Prevention: safety round/check completed  Intervention: Prevent Skin Injury  Recent Flowsheet Documentation  Taken 9/12/2024 1600 by Divya Rodriguez RN  Body Position: position changed independently  Intervention: Prevent Infection  Recent Flowsheet Documentation  Taken 9/12/2024 1600 by Divya Rodriguez RN  Infection Prevention:   rest/sleep promoted   hand hygiene promoted   single patient room provided   cohorting utilized   equipment surfaces disinfected  Goal: Optimal Comfort and Wellbeing  Outcome: Progressing  Goal: Readiness for Transition of " Care  Outcome: Progressing

## 2024-09-13 NOTE — DISCHARGE SUMMARY
[facility]  Hospitalist Discharge Summary      Date of Admission:  9/12/2024  Date of Discharge:  9/13/2024  Discharging Provider: Boaz Mcnamara MD  Discharge Service: Hospitalist Service    Discharge Diagnoses   Abdominal pain, nausea, vomiting  Cannabinoid hyperemesis syndrome    Clinically Significant Risk Factors          Follow-ups Needed After Discharge   Follow-up Appointments     Follow-up and recommended labs and tests       Follow up with primary care provider, Dasia Travis, within 7 days for   hospital follow- up.  The following labs/tests are recommended: chem 7,   magnesium, phosphorous.          Unresulted Labs Ordered in the Past 30 Days of this Admission       Date and Time Order Name Status Description    9/13/2024  7:21 AM Phosphorus In process         These results will be followed up by myself    Discharge Disposition   Discharged to home  Condition at discharge: Stable    Hospital Course   Jael Boateng is a 23 year old female with history SMA syndrome, H Pylori admitted on 9/12/2024 with nausea, vomiting, abdominal pain.  Patient states her symptoms started on Monday.  She has generalized abdominal pain which she describes as sharp.  She has been having nausea and vomiting.  She had 1 small loose bowel movement this morning.  She does not remember when her previous bowel movement was.  She has not been able to take much orally.  She denies chest pain.  She states she has shortness of breath from her symptoms.  No cough, runny nose, sore throat.  No fevers or chills.  No urinary symptoms.  On chart review, I can see she has been hospitalized and/or seen in the emergency room many times this year.  She always has similar symptoms.  She did have what they felt was an SMA syndrome during one of her hospitalizations.  She also had an endoscopy done and was found to have H. pylori.  She states she completed the treatment for this.  She does endorse smoking  marijuana.  While I am talking to her she asked if she could take a hot shower.    Patient was admitted to the hospital.  Her electrolytes were replaced.  She was treated supportively.  She was counseled on her marijuana use.  Patient is very motivated to stop using marijuana.  She has been able to eat a regular diet.  She feels ready to go home.  Her abdominal pain is much improved.  No further vomiting, although she is still using antinausea medication.  I will send her home with a prescription for Compazine, a small supply of oxy, Senokot for potential constipation, and a proton pump inhibitor.  She did complete her treatment for H. pylori, but she still had some reflux symptoms so I will send her with another month of Protonix.  She should follow-up with her gastroenterologist as well.  Her significant other was in the room.  His questions were answered.    Consultations This Hospital Stay   None    Code Status   Full Code    Time Spent on this Encounter   I, Boaz Mcnamara MD, personally saw the patient today and spent greater than 30 minutes discharging this patient.       Boaz Mcnamara MD  [facility] OBSERVATION DEPT  [address] [place] [address]  Phone: [phone]  ______________________________________________________________________    Physical Exam   Vital Signs: Temp: 98.4  F (36.9  C) Temp src: Oral BP: 113/80 Pulse: 70   Resp: 18 SpO2: 100 % O2 Device: None (Room air)    Weight: 108 lbs 0 oz  Constitutional: awake, alert, cooperative, no apparent distress, and appears stated age  Eyes: Lids and lashes normal, pupils equal, round and reactive to light, extra ocular muscles intact, sclera clear, conjunctiva normal  ENT: Normocephalic, without obvious abnormality, atraumatic, sinuses nontender on palpation, external ears without lesions, oral pharynx with moist mucous membranes, tonsils without erythema or exudates, gums normal and good dentition.       Primary Care  Physician   Dasia Travis    Discharge Orders      Primary Care - Care Coordination Referral      Reason for your hospital stay    Abdominal pain, nausea, vomiting.   Cannabinoid hyperemesis syndrome (symptoms due to marijuana use)     Follow-up and recommended labs and tests     Follow up with primary care provider, Dasia Travis, within 7 days for hospital follow- up.  The following labs/tests are recommended: chem 7, magnesium, phosphorous.     Activity    Your activity upon discharge: activity as tolerated     Diet    Follow this diet upon discharge: Current Diet:Orders Placed This Encounter      Regular Diet Adult       Significant Results and Procedures   Most Recent 3 CBC's:  Recent Labs   Lab Test 09/13/24  0642 09/12/24  0311   WBC 6.7 5.8   HGB 12.4 14.8   MCV 91 84   * 158     Most Recent 3 BMP's:  Recent Labs   Lab Test 09/13/24  0642 09/12/24  1824 09/12/24  1226 09/12/24  0311     --   --  134*   POTASSIUM 3.8  3.8 3.5 3.2* 2.4*   CHLORIDE 108*  --   --  94*   CO2 23  --   --  23   BUN 2.5*  --   --  6.4   CR 0.62  --   --  0.69   ANIONGAP 11  --   --  17*   EVGENY 8.8  --   --  9.4   *  --   --  113*     Most Recent 2 LFT's:  Recent Labs   Lab Test 09/12/24 0311   AST 18   ALT 19   ALKPHOS 53   BILITOTAL 0.6   ,   Results for orders placed or performed during the hospital encounter of 09/12/24   CTA Abdomen Pelvis with Contrast    Narrative    EXAM: CTA ABDOMEN PELVIS WITH CONTRAST  LOCATION: [facility]  DATE: 9/12/2024    INDICATION: Previous SMA syndrome, persistent vomiting  COMPARISON: CTA 9/9/2024 and 6/18/2024  TECHNIQUE: CT angiogram abdomen pelvis during arterial phase of injection of IV contrast. 2D and 3D MIP reconstructions were performed by the CT technologist. Dose reduction techniques were used.  CONTRAST: 72mL Isovue 370    FINDINGS:  ANGIOGRAM ABDOMEN/PELVIS: No dissections or aneurysms. No stenoses of the mesenteric vessels. Similar angle to  the superior mesenteric artery origin at 35 degrees which is lower limits of normal. Although there some slight narrowing of the duodenum as it   passes between the SMA and aorta there is no dilatation or any signs of obstruction or edema. Widely patent iliac arteries.    LOWER CHEST: Normal.    HEPATOBILIARY: Normal.    PANCREAS: Normal.    SPLEEN: Normal.    ADRENAL GLANDS: Normal.    KIDNEYS/BLADDER: Normal.    BOWEL: Normal bowel loops with no obstruction or inflammatory change.    LYMPH NODES: Normal.    PELVIC ORGANS: Normal.    MUSCULOSKELETAL: Normal.      Impression    IMPRESSION:  1.  No acute findings to explain the patient's pain or vomiting.    2.  Similar acute aorta-mesenteric angle at 35 degrees which is lower range of normal and no signs of compromise to the duodenum. No signs of obstruction or edema.       Discharge Medications   Current Discharge Medication List        START taking these medications    Details   capsaicin (ZOSTRIX) 0.025 % external cream Apply 1 Application topically 3 times daily as needed (pain).  Qty: 25 g, Refills: 0    Associated Diagnoses: Cannabinoid hyperemesis syndrome      oxyCODONE (ROXICODONE) 5 MG tablet Take 1 tablet (5 mg) by mouth every 6 hours as needed for moderate pain or severe pain.  Qty: 15 tablet, Refills: 0    Associated Diagnoses: Cannabinoid hyperemesis syndrome      pantoprazole (PROTONIX) 40 MG EC tablet Take 1 tablet (40 mg) by mouth every morning (before breakfast).  Qty: 30 tablet, Refills: 0    Associated Diagnoses: Cannabinoid hyperemesis syndrome; Gastroesophageal reflux disease without esophagitis      prochlorperazine (COMPAZINE) 10 MG tablet Take 0.5 tablets (5 mg) by mouth every 6 hours as needed for vomiting.  Qty: 20 tablet, Refills: 0    Associated Diagnoses: Cannabinoid hyperemesis syndrome      senna-docusate (SENOKOT-S/PERICOLACE) 8.6-50 MG tablet Take 1 tablet by mouth 2 times daily as needed for constipation.  Qty: 30 tablet, Refills:  0    Associated Diagnoses: Drug-induced constipation           CONTINUE these medications which have NOT CHANGED    Details   metoclopramide (REGLAN) 10 MG tablet Take 10 mg by mouth every 6 hours as needed (nausea and vomiting.).      ondansetron (ZOFRAN ODT) 4 MG ODT tab Take 4 mg by mouth 2 times daily.           Allergies   No Known Allergies

## 2024-09-13 NOTE — PLAN OF CARE
"  Problem: Adult Inpatient Plan of Care  Goal: Plan of Care Review  Description: The Plan of Care Review/Shift note should be completed every shift.  The Outcome Evaluation is a brief statement about your assessment that the patient is improving, declining, or no change.  This information will be displayed automatically on your shift  note.  9/12/2024 1937 by Divya Rodriguez RN  Outcome: Progressing  Flowsheets (Taken 9/12/2024 1937)  Outcome Evaluation: PT A/Ox4, no c/o pain. PT eating. IVF infusing. Emotional age not showing appropaite to actual age.  Plan of Care Reviewed With: patient  Overall Patient Progress: improving  9/12/2024 1936 by Divya Rodriguez RN  Outcome: Progressing  Flowsheets (Taken 9/12/2024 1936)  Outcome Evaluation: PT a/o x4, no c/o pain, went for shower, IVF infusing, family bedside. Indep.  Plan of Care Reviewed With: patient  Overall Patient Progress: improving  Goal: Patient-Specific Goal (Individualized)  Description: You can add care plan individualizations to a care plan. Examples of Individualization might be:  \"Parent requests to be called daily at 9am for status\", \"I have a hard time hearing out of my right ear\", or \"Do not touch me to wake me up as it startles  me\".  9/12/2024 1937 by Divya Rodriguez RN  Outcome: Progressing  9/12/2024 1936 by Divya Rodriguez RN  Outcome: Progressing  Goal: Absence of Hospital-Acquired Illness or Injury  9/12/2024 1937 by Divya Rodriguez RN  Outcome: Progressing  9/12/2024 1936 by Divya Rodriguez RN  Outcome: Progressing  Intervention: Identify and Manage Fall Risk  Recent Flowsheet Documentation  Taken 9/12/2024 1600 by Divya Rodriguez RN  Safety Promotion/Fall Prevention: safety round/check completed  Intervention: Prevent Skin Injury  Recent Flowsheet Documentation  Taken 9/12/2024 1600 by Divya Rodriguez RN  Body Position: position changed independently  Intervention: Prevent Infection  Recent Flowsheet Documentation  Taken " 9/12/2024 1600 by Divya Rodriguez RN  Infection Prevention:   rest/sleep promoted   hand hygiene promoted   single patient room provided   cohorting utilized   equipment surfaces disinfected  Goal: Optimal Comfort and Wellbeing  9/12/2024 1937 by Divya Rodriguez RN  Outcome: Progressing  9/12/2024 1936 by Divya Rodriguez RN  Outcome: Progressing  Goal: Readiness for Transition of Care  9/12/2024 1937 by Divya Rodriguez RN  Outcome: Progressing  9/12/2024 1936 by Divya Rodriguez RN  Outcome: Progressing   Goal Outcome Evaluation:      Plan of Care Reviewed With: patient    Overall Patient Progress: improvingOverall Patient Progress: improving    Outcome Evaluation: PT A/Ox4, no c/o pain. PT eating. IVF infusing. Emotional age not showing appropaite to actual age.

## 2024-09-19 ENCOUNTER — PATIENT OUTREACH (OUTPATIENT)
Dept: CARE COORDINATION | Facility: CLINIC | Age: 24
End: 2024-09-19
Payer: COMMERCIAL

## 2024-09-19 NOTE — LETTER
[facility] CARE COORDINATION  [address] [place] [address]    September 20, 2024    Jael Boateng  [address] [place] [address]      Dear Jael,    I am a clinic care coordinator who works with Dasia Travis NP with the [facility]. I wanted to introduce myself and provide you with my contact information for you to be able to call me with any questions or concerns. Below is a description of clinic care coordination and how I can further assist you.       The clinic care coordination team is made up of a registered nurse, , financial resource worker and community health worker who understand the health care system. The goal of clinic care coordination is to help you manage your health and improve access to the health care system. Our team works alongside your provider to assist you in determining your health and social needs. We can help you obtain health care and community resources, providing you with necessary information and education. We can work with you through any barriers and develop a care plan that helps coordinate and strengthen the communication between you and your care team.  Our services are voluntary and are offered without charge to you personally.    Please feel free to contact me with any questions or concerns regarding care coordination and what we can offer.      We are focused on providing you with the highest-quality healthcare experience possible.    Sincerely,     Barry Weber, [facility] [place]  Clinic Care Coordinator  [facility]  [phone]  [email or web address]

## 2024-09-19 NOTE — PROGRESS NOTES
Clinic Care Coordination Contact  [facility]/Voicemail    Clinical Data: Care Coordinator Outreach    Outreach Documentation Number of Outreach Attempt   9/19/2024   1:48 PM 1       Left message on patient's voicemail with call back information and requested return call.    Plan: Care Coordinator will try to reach patient again in 1-2 business days.    Barry Weber [facility] [place] [facility]  Clinic Care Coordinator  [facility]  [phone]  [email or web address]

## 2024-09-20 NOTE — PROGRESS NOTES
Clinic Care Coordination Contact  [facility]/Voicemail    Clinical Data: Care Coordinator Outreach    Outreach Documentation Number of Outreach Attempt   9/19/2024   1:48 PM 1   9/20/2024   9:37 AM 2       Left message on patient's voicemail with call back information and requested return call.    Plan: Care Coordinator will send care coordination introduction letter with care coordinator contact information and explanation of care coordination services via [email or web address]. Care Coordinator will do no further outreaches at this time.    Barry Weber [facility]  Clinic Care Coordinator  [facility]  [phone]  [email or web address]

## 2025-02-21 ENCOUNTER — HOSPITAL ENCOUNTER (EMERGENCY)
Facility: CLINIC | Age: 25
Discharge: HOME | End: 2025-02-21
Attending: EMERGENCY MEDICINE | Admitting: EMERGENCY MEDICINE
Payer: COMMERCIAL

## 2025-02-21 VITALS
SYSTOLIC BLOOD PRESSURE: 111 MMHG | BODY MASS INDEX: 21.3 KG/M2 | OXYGEN SATURATION: 100 % | DIASTOLIC BLOOD PRESSURE: 70 MMHG | RESPIRATION RATE: 18 BRPM | HEIGHT: 62 IN | HEART RATE: 84 BPM | WEIGHT: 115.74 LBS | TEMPERATURE: 98.7 F

## 2025-02-21 DIAGNOSIS — R11.10 VOMITING AND DIARRHEA: ICD-10-CM

## 2025-02-21 DIAGNOSIS — R10.13 EPIGASTRIC PAIN: ICD-10-CM

## 2025-02-21 DIAGNOSIS — R19.7 VOMITING AND DIARRHEA: ICD-10-CM

## 2025-02-21 LAB
ALBUMIN SERPL BCG-MCNC: 4.6 G/DL (ref 3.5–5.2)
ALP SERPL-CCNC: 66 U/L (ref 40–150)
ALT SERPL W P-5'-P-CCNC: 24 U/L (ref 0–50)
ANION GAP SERPL CALCULATED.3IONS-SCNC: 15 MMOL/L (ref 7–15)
AST SERPL W P-5'-P-CCNC: 26 U/L (ref 0–45)
BASOPHILS # BLD AUTO: 0 10E3/UL (ref 0–0.2)
BASOPHILS NFR BLD AUTO: 1 %
BILIRUB SERPL-MCNC: 0.3 MG/DL
BUN SERPL-MCNC: 8.2 MG/DL (ref 6–20)
CALCIUM SERPL-MCNC: 9.5 MG/DL (ref 8.8–10.4)
CHLORIDE SERPL-SCNC: 103 MMOL/L (ref 98–107)
CREAT SERPL-MCNC: 0.58 MG/DL (ref 0.51–0.95)
EGFRCR SERPLBLD CKD-EPI 2021: >90 ML/MIN/1.73M2
EOSINOPHIL # BLD AUTO: 0 10E3/UL (ref 0–0.7)
EOSINOPHIL NFR BLD AUTO: 1 %
ERYTHROCYTE [DISTWIDTH] IN BLOOD BY AUTOMATED COUNT: 11.6 % (ref 10–15)
GLUCOSE SERPL-MCNC: 98 MG/DL (ref 70–99)
HCG SERPL QL: NEGATIVE
HCO3 SERPL-SCNC: 19 MMOL/L (ref 22–29)
HCT VFR BLD AUTO: 43.1 % (ref 35–47)
HGB BLD-MCNC: 14.9 G/DL (ref 11.7–15.7)
IMM GRANULOCYTES # BLD: 0 10E3/UL
IMM GRANULOCYTES NFR BLD: 1 %
LIPASE SERPL-CCNC: 29 U/L (ref 13–60)
LYMPHOCYTES # BLD AUTO: 1.2 10E3/UL (ref 0.8–5.3)
LYMPHOCYTES NFR BLD AUTO: 29 %
MCH RBC QN AUTO: 29.2 PG (ref 26.5–33)
MCHC RBC AUTO-ENTMCNC: 34.6 G/DL (ref 31.5–36.5)
MCV RBC AUTO: 84 FL (ref 78–100)
MONOCYTES # BLD AUTO: 0.2 10E3/UL (ref 0–1.3)
MONOCYTES NFR BLD AUTO: 5 %
NEUTROPHILS # BLD AUTO: 2.8 10E3/UL (ref 1.6–8.3)
NEUTROPHILS NFR BLD AUTO: 65 %
NRBC # BLD AUTO: 0 10E3/UL
NRBC BLD AUTO-RTO: 0 /100
PLATELET # BLD AUTO: 121 10E3/UL (ref 150–450)
POTASSIUM SERPL-SCNC: 3.5 MMOL/L (ref 3.4–5.3)
PROT SERPL-MCNC: 8.1 G/DL (ref 6.4–8.3)
RBC # BLD AUTO: 5.11 10E6/UL (ref 3.8–5.2)
SODIUM SERPL-SCNC: 137 MMOL/L (ref 135–145)
WBC # BLD AUTO: 4.2 10E3/UL (ref 4–11)

## 2025-02-21 PROCEDURE — 250N000011 HC RX IP 250 OP 636: Performed by: EMERGENCY MEDICINE

## 2025-02-21 PROCEDURE — 85004 AUTOMATED DIFF WBC COUNT: CPT | Performed by: EMERGENCY MEDICINE

## 2025-02-21 PROCEDURE — 258N000003 HC RX IP 258 OP 636: Performed by: EMERGENCY MEDICINE

## 2025-02-21 PROCEDURE — 250N000009 HC RX 250: Performed by: EMERGENCY MEDICINE

## 2025-02-21 PROCEDURE — 96374 THER/PROPH/DIAG INJ IV PUSH: CPT

## 2025-02-21 PROCEDURE — 83690 ASSAY OF LIPASE: CPT | Performed by: EMERGENCY MEDICINE

## 2025-02-21 PROCEDURE — 84703 CHORIONIC GONADOTROPIN ASSAY: CPT | Performed by: EMERGENCY MEDICINE

## 2025-02-21 PROCEDURE — 82247 BILIRUBIN TOTAL: CPT | Performed by: EMERGENCY MEDICINE

## 2025-02-21 PROCEDURE — 96375 TX/PRO/DX INJ NEW DRUG ADDON: CPT

## 2025-02-21 PROCEDURE — 36415 COLL VENOUS BLD VENIPUNCTURE: CPT | Performed by: EMERGENCY MEDICINE

## 2025-02-21 PROCEDURE — 82040 ASSAY OF SERUM ALBUMIN: CPT | Performed by: EMERGENCY MEDICINE

## 2025-02-21 PROCEDURE — 96361 HYDRATE IV INFUSION ADD-ON: CPT

## 2025-02-21 PROCEDURE — 99284 EMERGENCY DEPT VISIT MOD MDM: CPT | Mod: 25

## 2025-02-21 RX ORDER — FAMOTIDINE 20 MG/1
20 TABLET, FILM COATED ORAL 2 TIMES DAILY
Qty: 28 TABLET | Refills: 0 | Status: SHIPPED | OUTPATIENT
Start: 2025-02-21 | End: 2025-03-07

## 2025-02-21 RX ORDER — SIMETHICONE 125 MG
125 TABLET,CHEWABLE ORAL 2 TIMES DAILY PRN
Qty: 30 TABLET | Refills: 0 | Status: SHIPPED | OUTPATIENT
Start: 2025-02-21

## 2025-02-21 RX ORDER — ONDANSETRON 4 MG/1
4 TABLET, ORALLY DISINTEGRATING ORAL EVERY 8 HOURS PRN
Qty: 10 TABLET | Refills: 0 | Status: ON HOLD | OUTPATIENT
Start: 2025-02-21 | End: 2025-02-25

## 2025-02-21 RX ORDER — ONDANSETRON 2 MG/ML
4 INJECTION INTRAMUSCULAR; INTRAVENOUS EVERY 30 MIN PRN
Status: DISCONTINUED | OUTPATIENT
Start: 2025-02-21 | End: 2025-02-21 | Stop reason: HOSPADM

## 2025-02-21 RX ORDER — KETOROLAC TROMETHAMINE 15 MG/ML
15 INJECTION, SOLUTION INTRAMUSCULAR; INTRAVENOUS ONCE
Status: COMPLETED | OUTPATIENT
Start: 2025-02-21 | End: 2025-02-21

## 2025-02-21 RX ADMIN — PANTOPRAZOLE SODIUM 40 MG: 40 INJECTION, POWDER, FOR SOLUTION INTRAVENOUS at 11:31

## 2025-02-21 RX ADMIN — SODIUM CHLORIDE 1000 ML: 9 INJECTION, SOLUTION INTRAVENOUS at 11:32

## 2025-02-21 RX ADMIN — ONDANSETRON 4 MG: 2 INJECTION, SOLUTION INTRAMUSCULAR; INTRAVENOUS at 11:32

## 2025-02-21 RX ADMIN — KETOROLAC TROMETHAMINE 15 MG: 15 INJECTION, SOLUTION INTRAMUSCULAR; INTRAVENOUS at 11:31

## 2025-02-21 ASSESSMENT — ACTIVITIES OF DAILY LIVING (ADL)
ADLS_ACUITY_SCORE: 46

## 2025-02-21 ASSESSMENT — COLUMBIA-SUICIDE SEVERITY RATING SCALE - C-SSRS
6. HAVE YOU EVER DONE ANYTHING, STARTED TO DO ANYTHING, OR PREPARED TO DO ANYTHING TO END YOUR LIFE?: NO
2. HAVE YOU ACTUALLY HAD ANY THOUGHTS OF KILLING YOURSELF IN THE PAST MONTH?: NO
1. IN THE PAST MONTH, HAVE YOU WISHED YOU WERE DEAD OR WISHED YOU COULD GO TO SLEEP AND NOT WAKE UP?: NO
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
## 2025-02-23 ENCOUNTER — APPOINTMENT (OUTPATIENT)
Dept: ULTRASOUND IMAGING | Facility: CLINIC | Age: 25
DRG: 392 | End: 2025-02-23
Attending: BEHAVIOR TECHNICIAN
Payer: COMMERCIAL

## 2025-02-23 ENCOUNTER — HOSPITAL ENCOUNTER (INPATIENT)
Facility: CLINIC | Age: 25
LOS: 1 days | Discharge: HOME | DRG: 392 | End: 2025-02-25
Attending: BEHAVIOR TECHNICIAN | Admitting: INTERNAL MEDICINE
Payer: COMMERCIAL

## 2025-02-23 ENCOUNTER — APPOINTMENT (OUTPATIENT)
Dept: CT IMAGING | Facility: CLINIC | Age: 25
DRG: 392 | End: 2025-02-23
Attending: BEHAVIOR TECHNICIAN
Payer: COMMERCIAL

## 2025-02-23 DIAGNOSIS — R11.2 CANNABINOID HYPEREMESIS SYNDROME: ICD-10-CM

## 2025-02-23 DIAGNOSIS — R11.2 INTRACTABLE NAUSEA AND VOMITING: ICD-10-CM

## 2025-02-23 DIAGNOSIS — E87.6 HYPOKALEMIA: Primary | ICD-10-CM

## 2025-02-23 DIAGNOSIS — F12.90 CANNABINOID HYPEREMESIS SYNDROME: ICD-10-CM

## 2025-02-23 LAB
ALBUMIN SERPL BCG-MCNC: 4.3 G/DL (ref 3.5–5.2)
ALP SERPL-CCNC: 60 U/L (ref 40–150)
ALT SERPL W P-5'-P-CCNC: 19 U/L (ref 0–50)
AMPHETAMINES UR QL SCN: ABNORMAL
ANION GAP SERPL CALCULATED.3IONS-SCNC: 16 MMOL/L (ref 7–15)
AST SERPL W P-5'-P-CCNC: 23 U/L (ref 0–45)
B-OH-BUTYR SERPL-SCNC: 2.95 MMOL/L
BARBITURATES UR QL SCN: ABNORMAL
BASOPHILS # BLD AUTO: 0 10E3/UL (ref 0–0.2)
BASOPHILS NFR BLD AUTO: 0 %
BENZODIAZ UR QL SCN: ABNORMAL
BILIRUB SERPL-MCNC: 0.6 MG/DL
BUN SERPL-MCNC: 11.2 MG/DL (ref 6–20)
BZE UR QL SCN: ABNORMAL
CALCIUM SERPL-MCNC: 9.5 MG/DL (ref 8.8–10.4)
CANNABINOIDS UR QL SCN: ABNORMAL
CHLORIDE SERPL-SCNC: 103 MMOL/L (ref 98–107)
CREAT SERPL-MCNC: 0.63 MG/DL (ref 0.51–0.95)
EGFRCR SERPLBLD CKD-EPI 2021: >90 ML/MIN/1.73M2
EOSINOPHIL # BLD AUTO: 0.1 10E3/UL (ref 0–0.7)
EOSINOPHIL NFR BLD AUTO: 1 %
ERYTHROCYTE [DISTWIDTH] IN BLOOD BY AUTOMATED COUNT: 11.3 % (ref 10–15)
FENTANYL UR QL: ABNORMAL
GLUCOSE SERPL-MCNC: 117 MG/DL (ref 70–99)
HCG SERPL QL: NEGATIVE
HCO3 SERPL-SCNC: 16 MMOL/L (ref 22–29)
HCT VFR BLD AUTO: 38.8 % (ref 35–47)
HGB BLD-MCNC: 13.7 G/DL (ref 11.7–15.7)
IMM GRANULOCYTES # BLD: 0 10E3/UL
IMM GRANULOCYTES NFR BLD: 0 %
LACTATE SERPL-SCNC: 1.1 MMOL/L (ref 0.7–2)
LACTATE SERPL-SCNC: 2.8 MMOL/L (ref 0.7–2)
LIPASE SERPL-CCNC: 25 U/L (ref 13–60)
LYMPHOCYTES # BLD AUTO: 1.1 10E3/UL (ref 0.8–5.3)
LYMPHOCYTES NFR BLD AUTO: 21 %
MAGNESIUM SERPL-MCNC: 1.9 MG/DL (ref 1.7–2.3)
MCH RBC QN AUTO: 30 PG (ref 26.5–33)
MCHC RBC AUTO-ENTMCNC: 35.3 G/DL (ref 31.5–36.5)
MCV RBC AUTO: 85 FL (ref 78–100)
MONOCYTES # BLD AUTO: 0.3 10E3/UL (ref 0–1.3)
MONOCYTES NFR BLD AUTO: 6 %
NEUTROPHILS # BLD AUTO: 3.7 10E3/UL (ref 1.6–8.3)
NEUTROPHILS NFR BLD AUTO: 72 %
NRBC # BLD AUTO: 0 10E3/UL
NRBC BLD AUTO-RTO: 0 /100
OPIATES UR QL SCN: ABNORMAL
PCP QUAL URINE (ROCHE): ABNORMAL
PLATELET # BLD AUTO: 123 10E3/UL (ref 150–450)
POTASSIUM SERPL-SCNC: 3.7 MMOL/L (ref 3.4–5.3)
PROT SERPL-MCNC: 7.7 G/DL (ref 6.4–8.3)
RBC # BLD AUTO: 4.56 10E6/UL (ref 3.8–5.2)
SODIUM SERPL-SCNC: 135 MMOL/L (ref 135–145)
TROPONIN T SERPL HS-MCNC: <6 NG/L
WBC # BLD AUTO: 5.2 10E3/UL (ref 4–11)

## 2025-02-23 PROCEDURE — 250N000009 HC RX 250: Performed by: BEHAVIOR TECHNICIAN

## 2025-02-23 PROCEDURE — G0378 HOSPITAL OBSERVATION PER HR: HCPCS

## 2025-02-23 PROCEDURE — 96361 HYDRATE IV INFUSION ADD-ON: CPT

## 2025-02-23 PROCEDURE — 83605 ASSAY OF LACTIC ACID: CPT | Performed by: BEHAVIOR TECHNICIAN

## 2025-02-23 PROCEDURE — 84484 ASSAY OF TROPONIN QUANT: CPT | Performed by: BEHAVIOR TECHNICIAN

## 2025-02-23 PROCEDURE — 80053 COMPREHEN METABOLIC PANEL: CPT | Performed by: BEHAVIOR TECHNICIAN

## 2025-02-23 PROCEDURE — 83735 ASSAY OF MAGNESIUM: CPT | Performed by: BEHAVIOR TECHNICIAN

## 2025-02-23 PROCEDURE — 82010 KETONE BODYS QUAN: CPT | Performed by: INTERNAL MEDICINE

## 2025-02-23 PROCEDURE — 36415 COLL VENOUS BLD VENIPUNCTURE: CPT | Performed by: BEHAVIOR TECHNICIAN

## 2025-02-23 PROCEDURE — 258N000003 HC RX IP 258 OP 636: Performed by: BEHAVIOR TECHNICIAN

## 2025-02-23 PROCEDURE — 85025 COMPLETE CBC W/AUTO DIFF WBC: CPT | Performed by: BEHAVIOR TECHNICIAN

## 2025-02-23 PROCEDURE — 76705 ECHO EXAM OF ABDOMEN: CPT

## 2025-02-23 PROCEDURE — 99285 EMERGENCY DEPT VISIT HI MDM: CPT | Mod: 25

## 2025-02-23 PROCEDURE — 96375 TX/PRO/DX INJ NEW DRUG ADDON: CPT

## 2025-02-23 PROCEDURE — 250N000011 HC RX IP 250 OP 636: Performed by: BEHAVIOR TECHNICIAN

## 2025-02-23 PROCEDURE — 83690 ASSAY OF LIPASE: CPT | Performed by: BEHAVIOR TECHNICIAN

## 2025-02-23 PROCEDURE — 258N000003 HC RX IP 258 OP 636: Performed by: INTERNAL MEDICINE

## 2025-02-23 PROCEDURE — 96376 TX/PRO/DX INJ SAME DRUG ADON: CPT

## 2025-02-23 PROCEDURE — 74177 CT ABD & PELVIS W/CONTRAST: CPT

## 2025-02-23 PROCEDURE — 250N000009 HC RX 250: Performed by: INTERNAL MEDICINE

## 2025-02-23 PROCEDURE — 250N000011 HC RX IP 250 OP 636: Performed by: INTERNAL MEDICINE

## 2025-02-23 PROCEDURE — 250N000013 HC RX MED GY IP 250 OP 250 PS 637: Performed by: INTERNAL MEDICINE

## 2025-02-23 PROCEDURE — 80307 DRUG TEST PRSMV CHEM ANLYZR: CPT | Performed by: BEHAVIOR TECHNICIAN

## 2025-02-23 PROCEDURE — 93005 ELECTROCARDIOGRAM TRACING: CPT

## 2025-02-23 PROCEDURE — 84703 CHORIONIC GONADOTROPIN ASSAY: CPT | Performed by: BEHAVIOR TECHNICIAN

## 2025-02-23 PROCEDURE — 96374 THER/PROPH/DIAG INJ IV PUSH: CPT | Mod: 59

## 2025-02-23 PROCEDURE — 99222 1ST HOSP IP/OBS MODERATE 55: CPT | Performed by: INTERNAL MEDICINE

## 2025-02-23 RX ORDER — HALOPERIDOL 5 MG/ML
5 INJECTION INTRAMUSCULAR ONCE
Status: COMPLETED | OUTPATIENT
Start: 2025-02-23 | End: 2025-02-23

## 2025-02-23 RX ORDER — PROCHLORPERAZINE MALEATE 10 MG
10 TABLET ORAL EVERY 6 HOURS PRN
Status: DISCONTINUED | OUTPATIENT
Start: 2025-02-23 | End: 2025-02-25 | Stop reason: HOSPADM

## 2025-02-23 RX ORDER — POLYETHYLENE GLYCOL 3350 17 G
2 POWDER IN PACKET (EA) ORAL
Status: DISCONTINUED | OUTPATIENT
Start: 2025-02-23 | End: 2025-02-25 | Stop reason: HOSPADM

## 2025-02-23 RX ORDER — AMOXICILLIN 250 MG
2 CAPSULE ORAL 2 TIMES DAILY PRN
Status: DISCONTINUED | OUTPATIENT
Start: 2025-02-23 | End: 2025-02-25 | Stop reason: HOSPADM

## 2025-02-23 RX ORDER — ONDANSETRON 4 MG/1
4 TABLET, ORALLY DISINTEGRATING ORAL EVERY 6 HOURS PRN
Status: DISCONTINUED | OUTPATIENT
Start: 2025-02-23 | End: 2025-02-25 | Stop reason: HOSPADM

## 2025-02-23 RX ORDER — ONDANSETRON 2 MG/ML
4 INJECTION INTRAMUSCULAR; INTRAVENOUS EVERY 30 MIN PRN
Status: COMPLETED | OUTPATIENT
Start: 2025-02-23 | End: 2025-02-23

## 2025-02-23 RX ORDER — SODIUM CHLORIDE, SODIUM LACTATE, POTASSIUM CHLORIDE, CALCIUM CHLORIDE 600; 310; 30; 20 MG/100ML; MG/100ML; MG/100ML; MG/100ML
INJECTION, SOLUTION INTRAVENOUS CONTINUOUS
Status: ACTIVE | OUTPATIENT
Start: 2025-02-23 | End: 2025-02-23

## 2025-02-23 RX ORDER — ONDANSETRON 2 MG/ML
4 INJECTION INTRAMUSCULAR; INTRAVENOUS EVERY 6 HOURS PRN
Status: DISCONTINUED | OUTPATIENT
Start: 2025-02-23 | End: 2025-02-25 | Stop reason: HOSPADM

## 2025-02-23 RX ORDER — AMOXICILLIN 250 MG
1 CAPSULE ORAL 2 TIMES DAILY PRN
Status: DISCONTINUED | OUTPATIENT
Start: 2025-02-23 | End: 2025-02-25 | Stop reason: HOSPADM

## 2025-02-23 RX ORDER — MORPHINE SULFATE 4 MG/ML
4 INJECTION, SOLUTION INTRAMUSCULAR; INTRAVENOUS
Status: DISCONTINUED | OUTPATIENT
Start: 2025-02-23 | End: 2025-02-25 | Stop reason: HOSPADM

## 2025-02-23 RX ORDER — SCOPOLAMINE 1 MG/3D
1 PATCH, EXTENDED RELEASE TRANSDERMAL
Status: DISCONTINUED | OUTPATIENT
Start: 2025-02-23 | End: 2025-02-25 | Stop reason: HOSPADM

## 2025-02-23 RX ORDER — ACETAMINOPHEN 650 MG/1
650 SUPPOSITORY RECTAL EVERY 4 HOURS PRN
Status: DISCONTINUED | OUTPATIENT
Start: 2025-02-23 | End: 2025-02-25 | Stop reason: HOSPADM

## 2025-02-23 RX ORDER — IOPAMIDOL 755 MG/ML
58 INJECTION, SOLUTION INTRAVASCULAR ONCE
Status: COMPLETED | OUTPATIENT
Start: 2025-02-23 | End: 2025-02-23

## 2025-02-23 RX ORDER — SODIUM CHLORIDE, SODIUM LACTATE, POTASSIUM CHLORIDE, CALCIUM CHLORIDE 600; 310; 30; 20 MG/100ML; MG/100ML; MG/100ML; MG/100ML
INJECTION, SOLUTION INTRAVENOUS CONTINUOUS
Status: DISCONTINUED | OUTPATIENT
Start: 2025-02-23 | End: 2025-02-23

## 2025-02-23 RX ORDER — ACETAMINOPHEN 325 MG/1
650 TABLET ORAL EVERY 4 HOURS PRN
Status: DISCONTINUED | OUTPATIENT
Start: 2025-02-23 | End: 2025-02-25 | Stop reason: HOSPADM

## 2025-02-23 RX ADMIN — ONDANSETRON 4 MG: 2 INJECTION INTRAMUSCULAR; INTRAVENOUS at 20:54

## 2025-02-23 RX ADMIN — SODIUM CHLORIDE 1000 ML: 9 INJECTION, SOLUTION INTRAVENOUS at 10:44

## 2025-02-23 RX ADMIN — IOPAMIDOL 58 ML: 755 INJECTION, SOLUTION INTRAVENOUS at 14:49

## 2025-02-23 RX ADMIN — ONDANSETRON 4 MG: 2 INJECTION INTRAMUSCULAR; INTRAVENOUS at 10:44

## 2025-02-23 RX ADMIN — SODIUM CHLORIDE, POTASSIUM CHLORIDE, SODIUM LACTATE AND CALCIUM CHLORIDE: 600; 310; 30; 20 INJECTION, SOLUTION INTRAVENOUS at 19:58

## 2025-02-23 RX ADMIN — ONDANSETRON 4 MG: 2 INJECTION INTRAMUSCULAR; INTRAVENOUS at 13:07

## 2025-02-23 RX ADMIN — SODIUM CHLORIDE, SODIUM LACTATE, POTASSIUM CHLORIDE, AND CALCIUM CHLORIDE: .6; .31; .03; .02 INJECTION, SOLUTION INTRAVENOUS at 19:23

## 2025-02-23 RX ADMIN — SCOPOLAMINE 1 PATCH: 1.5 PATCH, EXTENDED RELEASE TRANSDERMAL at 19:23

## 2025-02-23 RX ADMIN — PANTOPRAZOLE SODIUM 40 MG: 40 INJECTION, POWDER, LYOPHILIZED, FOR SOLUTION INTRAVENOUS at 15:59

## 2025-02-23 RX ADMIN — SODIUM CHLORIDE, POTASSIUM CHLORIDE, SODIUM LACTATE AND CALCIUM CHLORIDE: 600; 310; 30; 20 INJECTION, SOLUTION INTRAVENOUS at 23:04

## 2025-02-23 RX ADMIN — Medication 5 MG: at 22:46

## 2025-02-23 RX ADMIN — MORPHINE SULFATE 4 MG: 4 INJECTION, SOLUTION INTRAMUSCULAR; INTRAVENOUS at 10:44

## 2025-02-23 RX ADMIN — PROCHLORPERAZINE EDISYLATE 10 MG: 5 INJECTION INTRAMUSCULAR; INTRAVENOUS at 23:04

## 2025-02-23 RX ADMIN — HALOPERIDOL LACTATE 5 MG: 5 INJECTION, SOLUTION INTRAMUSCULAR at 13:34

## 2025-02-23 RX ADMIN — SODIUM CHLORIDE 1000 ML: 9 INJECTION, SOLUTION INTRAVENOUS at 16:17

## 2025-02-23 ASSESSMENT — ACTIVITIES OF DAILY LIVING (ADL)
ADLS_ACUITY_SCORE: 46
ADLS_ACUITY_SCORE: 46
ADLS_ACUITY_SCORE: 47
ADLS_ACUITY_SCORE: 23
ADLS_ACUITY_SCORE: 46
ADLS_ACUITY_SCORE: 23
ADLS_ACUITY_SCORE: 46
ADLS_ACUITY_SCORE: 23

## 2025-02-23 NOTE — ED PROVIDER NOTES
Emergency Department Note      History of Present Illness     Chief Complaint   Abdominal Pain    HPI   Jael Boateng is a 24 year old female with history of SMA syndrome who presents to the ED with a relative for evaluation of abdominal pain, nausea, vomiting, diarrhea.  Patient was seen in the ED 2 days ago for similar symptoms.  The patient reports that after she was discharged on Friday, her pain resolved, but when she woke up this morning the pain returned. She describes her pain as being in the epigastric region. She also endorses nausea, vomiting, and a couple episodes of nonbloody liquid diarrhea this morning. Notes that the pain is the worst of all her symptoms. Relative mentions that her emesis is nonbloody and consists mostly of bile. Patient was not vomiting yesterday or after her discharge on Friday. She has only had broth since her vomiting started, and denies eating any acidic or spicy foods. Denies history of abdominal surgeries. Mentions that she is currently on her menstrual cycle and denies concern for pregnancy.  She denies alcohol use. Relative states that he was smoking marijuana and patient was around him but patient denies any marijuana use.     Independent Historian   Relative as detailed above.    Review of External Notes   I reviewed the ED note with Dr. Mcmahon on 2/21/2025. Seen for vomiting, diarrhea, and abdominal pain. Labs were all reassuring. She was discharged with Pepcid, Zofran, and Mylicon.     Reviewed hospital mission note from 9/12/2024.  Patient admitted for abdominal pain, nausea, vomiting secondary to cannabinoid hyperemesis syndrome.    Past Medical History     Medical History and Problem List   Cannabinoid hyperemesis syndrome  H. Pylori infection  Hypokalemia   Superior mesenteric artery syndrome     Medications   Compazine  Mylicon   Pepcid  Protonix   Reglan  Roxicodone   Senokot  Zofran     Surgical History   EGD with biopsy    Physical Exam     Patient Vitals  "for the past 24 hrs:   BP Temp Temp src Pulse Resp SpO2 Height Weight   02/23/25 1600 96/54 -- -- 77 -- 100 % -- --   02/23/25 1530 98/64 -- -- 63 -- 100 % -- --   02/23/25 1515 105/80 -- -- 73 -- 99 % -- --   02/23/25 1442 112/73 -- -- -- -- 97 % -- --   02/23/25 1157 -- 97.5  F (36.4  C) Oral -- -- -- -- --   02/23/25 1115 120/73 -- -- 80 -- 99 % -- --   02/23/25 0819 122/76 (!) 96.3  F (35.7  C) -- 98 24 99 % 1.575 m (5' 2\") 52.5 kg (115 lb 11.9 oz)     Physical Exam  Physical Exam:  General: ill-appearing, mild distress, vomiting  Head: normocephalic, atraumatic  Eyes: PERRLA, EOMI  Ears: External ears appear normal.   Nose: no signs of bleeding   Throat: moist mucous membranes  Neck: Supple  CV: regular rate and rhythm  Pulm: lungs clear to ausculation bilaterally, normal respiratory effort, normal chest expansion with breathing   Abdomen: soft,epigastric and RUQ tenderness. No rigidity, guarding, or rebound tenderness.   MSK: No midline tenderness  Ext: normal range of motion of all extremities. No gross deformities  Skin: warm, dry, no rashes  Neuro: alert and oriented  Psych: Appropriate mood. Cooperative     Diagnostics     Lab Results   Labs Ordered and Resulted from Time of ED Arrival to Time of ED Departure   COMPREHENSIVE METABOLIC PANEL - Abnormal       Result Value    Sodium 135      Potassium 3.7      Carbon Dioxide (CO2) 16 (*)     Anion Gap 16 (*)     Urea Nitrogen 11.2      Creatinine 0.63      GFR Estimate >90      Calcium 9.5      Chloride 103      Glucose 117 (*)     Alkaline Phosphatase 60      AST 23      ALT 19      Protein Total 7.7      Albumin 4.3      Bilirubin Total 0.6     LACTIC ACID WHOLE BLOOD WITH 1X REPEAT IN 2 HR WHEN >2 - Abnormal    Lactic Acid, Initial 2.8 (*)    CBC WITH PLATELETS AND DIFFERENTIAL - Abnormal    WBC Count 5.2      RBC Count 4.56      Hemoglobin 13.7      Hematocrit 38.8      MCV 85      MCH 30.0      MCHC 35.3      RDW 11.3      Platelet Count 123 (*)     % " Neutrophils 72      % Lymphocytes 21      % Monocytes 6      % Eosinophils 1      % Basophils 0      % Immature Granulocytes 0      NRBCs per 100 WBC 0      Absolute Neutrophils 3.7      Absolute Lymphocytes 1.1      Absolute Monocytes 0.3      Absolute Eosinophils 0.1      Absolute Basophils 0.0      Absolute Immature Granulocytes 0.0      Absolute NRBCs 0.0     URINE DRUG SCREEN PANEL - Abnormal    Amphetamines Urine Screen Negative      Barbituates Urine Screen Negative      Benzodiazepine Urine Screen Negative      Cannabinoids Urine Screen Positive (*)     Cocaine Urine Screen Negative      Fentanyl Qual Urine Screen Negative      Opiates Urine Screen Positive (*)     PCP Urine Screen Negative     LIPASE - Normal    Lipase 25     TROPONIN T, HIGH SENSITIVITY - Normal    Troponin T, High Sensitivity <6     HCG QUALITATIVE PREGNANCY - Normal    hCG Serum Qualitative Negative     MAGNESIUM - Normal    Magnesium 1.9     LACTIC ACID WHOLE BLOOD - Normal    Lactic Acid 1.1       Imaging   CT Abdomen Pelvis w Contrast   Final Result   IMPRESSION:    1.  Patchy groundglass opacities in the right middle and lower lobes with secretions in some of the right lower lobe peripheral bronchioles. The appearance is nonspecific but may represent pneumonia or aspiration pneumonitis.   2.  No abnormality identified in the abdomen or pelvis.         US Abdomen Limited (RUQ)   Final Result   IMPRESSION:   1.  Normal limited abdominal ultrasound.              EKG   ECG taken at 0952, ECG read at 1000  Sinus rhythm with marked sinus arrhythmia    Rate 76 bpm. OH interval 158 ms. QRS duration 78 ms. QT/QTc 396/445 ms. P-R-T axes 63 71 52.    Independent Interpretation   None    ED Course      Medications Administered   Medications   ondansetron (ZOFRAN) injection 4 mg (4 mg Intravenous $Given 2/23/25 1307)   morphine (PF) injection 4 mg (4 mg Intravenous $Given 2/23/25 1044)   sodium chloride 0.9% BOLUS 1,000 mL (1,000 mLs Intravenous  $New Bag 2/23/25 1617)   sodium chloride 0.9% BOLUS 1,000 mL (0 mLs Intravenous Stopped 2/23/25 1215)   haloperidol lactate (HALDOL) injection 5 mg (5 mg Intravenous $Given 2/23/25 1334)   pantoprazole (PROTONIX) IV push injection 40 mg (40 mg Intravenous $Given 2/23/25 1559)   iopamidol (ISOVUE-370) solution 58 mL (58 mLs Intravenous $Given 2/23/25 1449)   sodium chloride (PF) 0.9% PF flush 54 mL (54 mLs Intravenous $Given 2/23/25 1449)     Procedures   None      Discussion of Management   Admitting Hospitalist, Dr. Zheng.  Staffed with Dr. Mallory    ED Course   ED Course as of 02/23/25 1724   Sun Feb 23, 2025   0939 I obtained history and examined the patient as noted above.    1235 I rechecked and updated the patient.    1311 I rechecked and updated the patient. Patient declined CT.   1350 I rechecked and updated the patient. She agreed to a CT.   1433 I rechecked and updated the patient.    1604 I rechecked and updated the patient. Patient had ambulation trial and endorsed feeling lightheaded when walking.   1628 I rechecked the patient after I was informed she vomited on the floor.   1632 Staffed with Dr. Mallory.   1658 Discussed with Dr. Zheng, hospitalist. Accepted patient for admission.     Additional Documentation  None    Medical Decision Making / Diagnosis     CMS Diagnoses: Lactic is elevated due to dehydration. At this time, there is no evidence of sepsis, severe sepsis, or septic shock.     MIPS       None    MDM   Jael Boateng is a 24 year old female with history of H. pylori, SMA syndrome, cannabinoid hyperemesis who presents to the ED for evaluation of nausea, vomiting, abdominal pain, and diarrhea.  Patient was seen in the ED 2 days ago with similar symptoms.  She presents today with worsening symptoms since discharge.  She states that she is  unable to tolerate oral intake and has been vomiting multiple times.  She also endorses worsening epigastric pain.  See further HPI details  above.  Broad differential was considered including flareup of known SMA syndrome, cannabinoid hyperemesis, gastritis/PUD, appendicitis, cholecystitis, choledocholithiasis, pancreatitis, infectious colitis, ischemic colitis, obstruction.  On exam, patient is ill-appearing but not toxic.  Vitals are reassuring.  Exam is notable for epigastric tenderness and RUQ tenderness.  No peritonitic signs.  CBC was notable for stable thrombocytopenia.  No leukocytosis.  CMP was notable for mildly elevated anion gap.  No evidence of significant electrolyte abnormalities or acute kidney dysfunction.  Normal LFTs.  Normal lipase.  Lactic was mildly elevated.  This is likely due to dehydration.  No evidence of sepsis or septic shock at this time.  Lactic normalized after fluids.  Ultrasound did not show evidence of cholecystitis or choledocholithiasis.  CT abdomen/pelvis showed patchy groundglass opacities in the right middle and lower lobes with secretions and some of the right lower lobe which could be concerning for pneumonia or aspiration pneumonitis.  Patient states that she had a cough earlier but denied current cough, fever, or shortness of breath. She is breathing comfortably on room air with no signs of hypoxia or respiratory distress.  No evidence of obstruction or other acute abnormalities. Patient's history and presentation seem consistent with cannabinoid hyperemesis.  This was confirmed by urine tox screen.  She was given 2 L of IV fluids, Zofran, and Haldol.  She was initially feeling improved and thought she could discharge home however, she began to feel ill again with nausea and more vomiting.  I think she would benefit from hospital admission for IV fluids and further medications.  I discussed plan with patient who is agreeable.  Discussed with hospitalist who accepted the patient for admission.      Disposition   The patient was admitted to the hospital.     Diagnosis     ICD-10-CM    1. Intractable nausea and  vomiting  R11.2       2. Cannabinoid hyperemesis syndrome  R11.2     F12.90         Scribe Disclosure:  I, RUBIO DASILVA, am serving as a scribe at 9:14 AM on 2/23/2025 to document services personally performed by Fantasma Rollins PA-C based on my observations and the provider's statements to me.      Fantasma Rollins PA-C  02/23/25 1740       Fantasma Rollins PA-C  02/23/25 1746

## 2025-02-23 NOTE — ED NOTES
[facility]  ED Nurse Handoff Report    ED Chief complaint: Abdominal Pain  . ED Diagnosis:   Final diagnoses:   Intractable nausea and vomiting   Cannabinoid hyperemesis syndrome       Allergies: No Known Allergies    Code Status: Full Code    Activity level - Baseline/Home:  independent.  Activity Level - Current:   assist of 1.   Lift room needed: No.   Bariatric: No   Needed: No   Isolation: No.   Infection: Not Applicable.     Respiratory status: Room air    Vital Signs (within 30 minutes):   Vitals:    02/23/25 1442 02/23/25 1515 02/23/25 1530 02/23/25 1600   BP: 112/73 105/80 98/64 96/54   Pulse:  73 63 77   Resp:       Temp:       TempSrc:       SpO2: 97% 99% 100% 100%   Weight:       Height:           Cardiac Rhythm:  ,      Pain level:    Patient confused: No.   Patient Falls Risk: nonskid shoes/slippers when out of bed, patient and family education, and activity supervised.   Elimination Status: Has voided     Patient Report - Initial Complaint: Abdominal pain.   Focused Assessment:   ED arrival note:    Patient arrives reporting abdominal pain that has increased since she was seen on Friday, workup was unremarkable. Tylenol PTA.      GastrointestinalGastrointestinal WDL: .WDL except (nausea, upper abdominal pain/burning)Abdominal Appearance: flat; nondistended    Abnormal Results:   Labs Ordered and Resulted from Time of ED Arrival to Time of ED Departure   COMPREHENSIVE METABOLIC PANEL - Abnormal       Result Value    Sodium 135      Potassium 3.7      Carbon Dioxide (CO2) 16 (*)     Anion Gap 16 (*)     Urea Nitrogen 11.2      Creatinine 0.63      GFR Estimate >90      Calcium 9.5      Chloride 103      Glucose 117 (*)     Alkaline Phosphatase 60      AST 23      ALT 19      Protein Total 7.7      Albumin 4.3      Bilirubin Total 0.6     LACTIC ACID WHOLE BLOOD WITH 1X REPEAT IN 2 HR WHEN >2 - Abnormal    Lactic Acid, Initial 2.8 (*)    CBC WITH PLATELETS AND  DIFFERENTIAL - Abnormal    WBC Count 5.2      RBC Count 4.56      Hemoglobin 13.7      Hematocrit 38.8      MCV 85      MCH 30.0      MCHC 35.3      RDW 11.3      Platelet Count 123 (*)     % Neutrophils 72      % Lymphocytes 21      % Monocytes 6      % Eosinophils 1      % Basophils 0      % Immature Granulocytes 0      NRBCs per 100 WBC 0      Absolute Neutrophils 3.7      Absolute Lymphocytes 1.1      Absolute Monocytes 0.3      Absolute Eosinophils 0.1      Absolute Basophils 0.0      Absolute Immature Granulocytes 0.0      Absolute NRBCs 0.0     URINE DRUG SCREEN PANEL - Abnormal    Amphetamines Urine Screen Negative      Barbituates Urine Screen Negative      Benzodiazepine Urine Screen Negative      Cannabinoids Urine Screen Positive (*)     Cocaine Urine Screen Negative      Fentanyl Qual Urine Screen Negative      Opiates Urine Screen Positive (*)     PCP Urine Screen Negative     LIPASE - Normal    Lipase 25     TROPONIN T, HIGH SENSITIVITY - Normal    Troponin T, High Sensitivity <6     HCG QUALITATIVE PREGNANCY - Normal    hCG Serum Qualitative Negative     MAGNESIUM - Normal    Magnesium 1.9     LACTIC ACID WHOLE BLOOD - Normal    Lactic Acid 1.1          CT Abdomen Pelvis w Contrast   Final Result   IMPRESSION:    1.  Patchy groundglass opacities in the right middle and lower lobes with secretions in some of the right lower lobe peripheral bronchioles. The appearance is nonspecific but may represent pneumonia or aspiration pneumonitis.   2.  No abnormality identified in the abdomen or pelvis.         US Abdomen Limited (RUQ)   Final Result   IMPRESSION:   1.  Normal limited abdominal ultrasound.                Treatments provided: See MAR  Family Comments: Family at bedside  OBS brochure/video discussed/provided to patient:  N/A  ED Medications:   Medications   ondansetron (ZOFRAN) injection 4 mg (4 mg Intravenous $Given 2/23/25 0651)   morphine (PF) injection 4 mg (4 mg Intravenous $Given 2/23/25  1044)   sodium chloride 0.9% BOLUS 1,000 mL (1,000 mLs Intravenous $New Bag 2/23/25 1617)   sodium chloride 0.9% BOLUS 1,000 mL (0 mLs Intravenous Stopped 2/23/25 1215)   haloperidol lactate (HALDOL) injection 5 mg (5 mg Intravenous $Given 2/23/25 1334)   pantoprazole (PROTONIX) IV push injection 40 mg (40 mg Intravenous $Given 2/23/25 1559)   iopamidol (ISOVUE-370) solution 58 mL (58 mLs Intravenous $Given 2/23/25 1449)   sodium chloride (PF) 0.9% PF flush 54 mL (54 mLs Intravenous $Given 2/23/25 1449)       Drips infusing:  No  For the majority of the shift this patient was Green.   Interventions performed were N/A.    Sepsis treatment initiated: No    Cares/treatment/interventions/medications to be completed following ED care: Nausea management    ED Nurse Name: Lois Santiago RN  4:41 PM  RECEIVING UNIT ED HANDOFF REVIEW    Above ED Nurse Handoff Report was reviewed: Yes  Reviewed by: Juany Paz RN on February 23, 2025 at 7:41 PM   LETY Steiner called the ED to inform them the note was read: Yes

## 2025-02-23 NOTE — ED PROVIDER NOTES
"ED APC SUPERVISION NOTE:   I evaluated this patient in conjunction with Fantasma Rollins PA-C  I have participated in the care of the patient and personally performed key elements of the history, exam, and medical decision making.      HPI:   Jael Boateng is a 24 year old female with history of SMA syndrome who presents with nausea and vomiting as well as abdominal pain.  Pain is localized to the upper abdomen, specifically the epigastrium.  She reports a couple episodes of diarrhea this morning that was not bloody or black.  She is most concerned about the amount of pain she is having.  She reports being hospitalized in the past for similar symptoms which was thought secondary to cannabinoid hyperemesis syndrome.  She was seen here recently for similar symptoms as well and felt improved briefly and then worsened.  She also notes that the pain seems to refer into her chest and she feels short of breath with her symptoms.  She has not had cough, congestion or fevers.    Independent Historian:   None    Review of External Notes: None     PMH    Medical History and Problem List   Cannabinoid hyperemesis syndrome  H. Pylori infection  Hypokalemia   Superior mesenteric artery syndrome      Medications   Compazine  Mylicon   Pepcid  Protonix   Reglan  Roxicodone   Senokot  Zofran      Surgical History   EGD with biopsy     EXAM:   BP 96/54   Pulse 77   Temp 97.5  F (36.4  C) (Oral)   Resp 24   Ht 1.575 m (5' 2\")   Wt 52.5 kg (115 lb 11.9 oz)   SpO2 100%   BMI 21.17 kg/m    General: Adult, laying on her side, appears uncomfortable  Eyes: PERRL, Conjunctive within normal limits.  No scleral icterus.  ENT: Moist mucous membranes  CV: Normal S1S2, no murmur, rub or gallop. Regular rate and rhythm  Resp: Clear to auscultation bilaterally. Normal respiratory effort.  GI: Abdomen is soft nondistended.  Epigastric tenderness to palpation.  No rebound or guarding.  MSK: No edema. Nontender. Normal active range of " motion.  Skin: Warm and dry. No rashes or lesions or ecchymoses on visible skin.  Neuro: Alert and oriented. Responds appropriately to all questions and commands.   Psych: Appropriate.    Independent Interpretation (X-rays, CTs, rhythm strip):  None    Consultations/Discussion of Management or Tests:  I discussed the patient with the primary evaluator, FARHEEN Rollins.  Plan and workup as appropriate.  PA consulted with the admitting hospitalist for admission.     MEDICAL DECISION MAKING/ASSESSMENT AND PLAN:   Jael Boateng is a 23-year-old female with history of superior mesenteric artery syndrome as well as hyperemesis thought secondary to cannabinoids who presents emergency department with concerns for epigastric pain with nausea and vomiting.  She was seen here in the emergency department with similar symptoms 2 days ago and was ultimately discharged home after symptoms were controlled.  On today's visit due to the severity of her pain, imaging was obtained initially with ultrasound and then CT scan.  Fortunately no signs of concerning vascular or intra-abdominal pathology.  She was starting to feel improved and was hydrated, then began to worsen again with regards to vomiting.  Discussed the groundglass changes on the lower aspect of the lungs, patient's significant other reported that she had had a previous slight cough and he currently has a cough/cold, but her symptoms have resolved.  She is denying any current respiratory symptoms.  Due to the intractable nature of her nausea/vomiting, she will be admitted for further care.     DIAGNOSIS:     ICD-10-CM    1. Nausea and vomiting  R11.2       2. Epigastric pain  R10.13       3. Cannabinoid hyperemesis syndrome  R11.2     F12.90             Ana Mallory MD  2/23/2025  [facility] EMERGENCY DEPT      Ana Mallory MD  02/23/25 0650

## 2025-02-23 NOTE — ED TRIAGE NOTES
Patient arrives reporting abdominal pain that has increased since she was seen on Friday, workup was unremarkable. Tylenol PTA.

## 2025-02-24 LAB
ANION GAP SERPL CALCULATED.3IONS-SCNC: 14 MMOL/L (ref 7–15)
ATRIAL RATE - MUSE: 76 BPM
BUN SERPL-MCNC: 4.6 MG/DL (ref 6–20)
CALCIUM SERPL-MCNC: 8.9 MG/DL (ref 8.8–10.4)
CHLORIDE SERPL-SCNC: 97 MMOL/L (ref 98–107)
CREAT SERPL-MCNC: 0.48 MG/DL (ref 0.51–0.95)
DIASTOLIC BLOOD PRESSURE - MUSE: NORMAL MMHG
EGFRCR SERPLBLD CKD-EPI 2021: >90 ML/MIN/1.73M2
ERYTHROCYTE [DISTWIDTH] IN BLOOD BY AUTOMATED COUNT: 11.4 % (ref 10–15)
GLUCOSE SERPL-MCNC: 106 MG/DL (ref 70–99)
HCO3 SERPL-SCNC: 18 MMOL/L (ref 22–29)
HCT VFR BLD AUTO: 39.3 % (ref 35–47)
HGB BLD-MCNC: 13.8 G/DL (ref 11.7–15.7)
INTERPRETATION ECG - MUSE: NORMAL
MCH RBC QN AUTO: 29.5 PG (ref 26.5–33)
MCHC RBC AUTO-ENTMCNC: 35.1 G/DL (ref 31.5–36.5)
MCV RBC AUTO: 84 FL (ref 78–100)
P AXIS - MUSE: 63 DEGREES
PLATELET # BLD AUTO: 115 10E3/UL (ref 150–450)
POTASSIUM SERPL-SCNC: 3.7 MMOL/L (ref 3.4–5.3)
PR INTERVAL - MUSE: 158 MS
QRS DURATION - MUSE: 78 MS
QT - MUSE: 396 MS
QTC - MUSE: 445 MS
R AXIS - MUSE: 71 DEGREES
RBC # BLD AUTO: 4.68 10E6/UL (ref 3.8–5.2)
SODIUM SERPL-SCNC: 129 MMOL/L (ref 135–145)
SYSTOLIC BLOOD PRESSURE - MUSE: NORMAL MMHG
T AXIS - MUSE: 52 DEGREES
VENTRICULAR RATE- MUSE: 76 BPM
WBC # BLD AUTO: 5.7 10E3/UL (ref 4–11)

## 2025-02-24 PROCEDURE — 250N000011 HC RX IP 250 OP 636: Performed by: INTERNAL MEDICINE

## 2025-02-24 PROCEDURE — 96361 HYDRATE IV INFUSION ADD-ON: CPT

## 2025-02-24 PROCEDURE — 36415 COLL VENOUS BLD VENIPUNCTURE: CPT | Performed by: INTERNAL MEDICINE

## 2025-02-24 PROCEDURE — 120N000001 HC R&B MED SURG/OB

## 2025-02-24 PROCEDURE — G0378 HOSPITAL OBSERVATION PER HR: HCPCS

## 2025-02-24 PROCEDURE — 250N000009 HC RX 250: Performed by: INTERNAL MEDICINE

## 2025-02-24 PROCEDURE — 258N000003 HC RX IP 258 OP 636: Performed by: HOSPITALIST

## 2025-02-24 PROCEDURE — 80048 BASIC METABOLIC PNL TOTAL CA: CPT | Performed by: INTERNAL MEDICINE

## 2025-02-24 PROCEDURE — 99232 SBSQ HOSP IP/OBS MODERATE 35: CPT | Performed by: HOSPITALIST

## 2025-02-24 PROCEDURE — 96376 TX/PRO/DX INJ SAME DRUG ADON: CPT

## 2025-02-24 PROCEDURE — 85027 COMPLETE CBC AUTOMATED: CPT | Performed by: INTERNAL MEDICINE

## 2025-02-24 RX ORDER — SODIUM CHLORIDE 9 MG/ML
INJECTION, SOLUTION INTRAVENOUS CONTINUOUS
Status: DISCONTINUED | OUTPATIENT
Start: 2025-02-24 | End: 2025-02-25

## 2025-02-24 RX ADMIN — PROCHLORPERAZINE EDISYLATE 10 MG: 5 INJECTION INTRAMUSCULAR; INTRAVENOUS at 19:55

## 2025-02-24 RX ADMIN — PROCHLORPERAZINE EDISYLATE 10 MG: 5 INJECTION INTRAMUSCULAR; INTRAVENOUS at 12:11

## 2025-02-24 RX ADMIN — SODIUM CHLORIDE: 900 INJECTION, SOLUTION INTRAVENOUS at 17:03

## 2025-02-24 RX ADMIN — ONDANSETRON 4 MG: 4 TABLET, ORALLY DISINTEGRATING ORAL at 08:31

## 2025-02-24 RX ADMIN — PROCHLORPERAZINE EDISYLATE 10 MG: 5 INJECTION INTRAMUSCULAR; INTRAVENOUS at 06:34

## 2025-02-24 RX ADMIN — SODIUM CHLORIDE: 900 INJECTION, SOLUTION INTRAVENOUS at 08:45

## 2025-02-24 RX ADMIN — ONDANSETRON 4 MG: 2 INJECTION, SOLUTION INTRAMUSCULAR; INTRAVENOUS at 01:55

## 2025-02-24 RX ADMIN — PANTOPRAZOLE SODIUM 40 MG: 40 INJECTION, POWDER, FOR SOLUTION INTRAVENOUS at 06:34

## 2025-02-24 RX ADMIN — ONDANSETRON 4 MG: 2 INJECTION, SOLUTION INTRAMUSCULAR; INTRAVENOUS at 18:34

## 2025-02-24 ASSESSMENT — ACTIVITIES OF DAILY LIVING (ADL)
ADLS_ACUITY_SCORE: 28
ADLS_ACUITY_SCORE: 28
ADLS_ACUITY_SCORE: 23
ADLS_ACUITY_SCORE: 29
ADLS_ACUITY_SCORE: 23
ADLS_ACUITY_SCORE: 28
ADLS_ACUITY_SCORE: 28
ADLS_ACUITY_SCORE: 23
ADLS_ACUITY_SCORE: 28
ADLS_ACUITY_SCORE: 23
ADLS_ACUITY_SCORE: 28
ADLS_ACUITY_SCORE: 23
ADLS_ACUITY_SCORE: 29
ADLS_ACUITY_SCORE: 28
ADLS_ACUITY_SCORE: 23
ADLS_ACUITY_SCORE: 28
ADLS_ACUITY_SCORE: 28

## 2025-02-24 NOTE — PLAN OF CARE
"Goal Outcome Evaluation:      Plan of Care Reviewed With: patient, friend    Overall Patient Progress: improvingOverall Patient Progress: improving    Outcome Evaluation: nausea. voiding spontaneously. sleeping between cares. wanting to go home, but not tolerating food or fluids. scopolamine patch and iv fluids. critical ketone 2.95. Davina spoke with pt and agreed to stay and get treatment. Significant other present and supportive.    Problem: Adult Inpatient Plan of Care  Goal: Plan of Care Review  Description: The Plan of Care Review/Shift note should be completed every shift.  The Outcome Evaluation is a brief statement about your assessment that the patient is improving, declining, or no change.  This information will be displayed automatically on your shift  note.  2/23/2025 1950 by Jessica Sahu RN  Outcome: Progressing  2/23/2025 1947 by Jessica Sahu RN  Outcome: Progressing  Flowsheets (Taken 2/23/2025 1947)  Outcome Evaluation: nausea. voiding spontaneously. sleeping between cares. wanting to go home, but not tolerating food or fluids. scopolamine patch and iv fluids. critical ketone 2.95. K  Plan of Care Reviewed With:   patient   friend  Overall Patient Progress: improving  Goal: Patient-Specific Goal (Individualized)  Description: You can add care plan individualizations to a care plan. Examples of Individualization might be:  \"Parent requests to be called daily at 9am for status\", \"I have a hard time hearing out of my right ear\", or \"Do not touch me to wake me up as it startles  me\".  2/23/2025 1950 by Jessica Sahu RN  Outcome: Progressing  2/23/2025 1947 by Jessica Sahu RN  Outcome: Progressing  Goal: Absence of Hospital-Acquired Illness or Injury  2/23/2025 1950 by Jessica Sahu RN  Outcome: Progressing  2/23/2025 1947 by Jessica Sahu RN  Outcome: Progressing  Intervention: Identify and Manage Fall Risk  Recent Flowsheet Documentation  Taken 2/23/2025 1732 by Jessica Sahu RN  Safety Promotion/Fall " Prevention:   activity supervised   clutter free environment maintained   lighting adjusted   nonskid shoes/slippers when out of bed   patient and family education   room door open   safety round/check completed   room near nurse's station   room organization consistent   supervised activity  Intervention: Prevent Skin Injury  Recent Flowsheet Documentation  Taken 2/23/2025 1732 by Jessica Sahu RN  Body Position: position changed independently  Intervention: Prevent Infection  Recent Flowsheet Documentation  Taken 2/23/2025 1732 by Jessica Sahu RN  Infection Prevention:   hand hygiene promoted   rest/sleep promoted   single patient room provided  Goal: Optimal Comfort and Wellbeing  2/23/2025 1950 by Jessica Sahu RN  Outcome: Progressing  2/23/2025 1947 by Jessica Sahu RN  Outcome: Progressing  Intervention: Monitor Pain and Promote Comfort  Recent Flowsheet Documentation  Taken 2/23/2025 1732 by Jessica Sahu RN  Pain Management Interventions:   repositioned   rest  Goal: Readiness for Transition of Care  2/23/2025 1950 by Jessica Sahu RN  Outcome: Progressing  2/23/2025 1947 by Jessica Sahu RN  Outcome: Progressing

## 2025-02-24 NOTE — PHARMACY-ADMISSION MEDICATION HISTORY
Pharmacist Admission Medication History    Admission medication history is complete. The information provided in this note is only as accurate as the sources available at the time of the update.    Information Source(s): Patient, Family member, and CareEverywhere/SureScripts via in-person    Pertinent Information: none    Changes made to PTA medication list:  Added: None  Deleted: None  Changed: None    Allergies reviewed with patient and updates made in EHR: yes    Medication History Completed By: Yoel Howe RPH 2/23/2025 6:03 PM    PTA Med List   Medication Sig Last Dose/Taking    capsaicin (ZOSTRIX) 0.025 % external cream Apply 1 Application topically 3 times daily as needed (pain). Taking As Needed    famotidine (PEPCID) 20 MG tablet Take 1 tablet (20 mg) by mouth 2 times daily for 14 days. 2/23/2025 Morning    metoclopramide (REGLAN) 10 MG tablet Take 10 mg by mouth every 6 hours as needed (nausea and vomiting.). Taking As Needed    ondansetron (ZOFRAN ODT) 4 MG ODT tab Take 1 tablet (4 mg) by mouth every 8 hours as needed for nausea or vomiting. Taking As Needed    ondansetron (ZOFRAN ODT) 4 MG ODT tab Take 4 mg by mouth 2 times daily. 2/23/2025 Morning    prochlorperazine (COMPAZINE) 10 MG tablet Take 0.5 tablets (5 mg) by mouth every 6 hours as needed for vomiting. Taking As Needed    senna-docusate (SENOKOT-S/PERICOLACE) 8.6-50 MG tablet Take 1 tablet by mouth 2 times daily as needed for constipation. Taking As Needed    simethicone (MYLICON) 125 MG chewable tablet Take 1 tablet (125 mg) by mouth 2 times daily as needed for intestinal gas. Taking As Needed

## 2025-02-24 NOTE — PLAN OF CARE
"  Problem: Adult Inpatient Plan of Care  Goal: Plan of Care Review  Description: The Plan of Care Review/Shift note should be completed every shift.  The Outcome Evaluation is a brief statement about your assessment that the patient is improving, declining, or no change.  This information will be displayed automatically on your shift  note.  Outcome: Progressing  Flowsheets (Taken 2/24/2025 0537)  Outcome Evaluation: N/V controlled with PRN IV meds, SBA , clear liquid diet.  Plan of Care Reviewed With: patient  Overall Patient Progress: no change  Goal: Patient-Specific Goal (Individualized)  Description: You can add care plan individualizations to a care plan. Examples of Individualization might be:  \"Parent requests to be called daily at 9am for status\", \"I have a hard time hearing out of my right ear\", or \"Do not touch me to wake me up as it startles  me\".  Outcome: Progressing  Goal: Absence of Hospital-Acquired Illness or Injury  Outcome: Progressing  Intervention: Identify and Manage Fall Risk  Recent Flowsheet Documentation  Taken 2/23/2025 2029 by Juany Paz, RN  Safety Promotion/Fall Prevention:   activity supervised   clutter free environment maintained   lighting adjusted   nonskid shoes/slippers when out of bed   patient and family education   room door open   safety round/check completed   room near nurse's station   room organization consistent   supervised activity  Goal: Optimal Comfort and Wellbeing  Outcome: Progressing  Goal: Readiness for Transition of Care  Outcome: Progressing  Intervention: Mutually Develop Transition Plan  Recent Flowsheet Documentation  Taken 2/23/2025 2000 by Juany Paz, RN  Equipment Currently Used at Home: none   Goal Outcome Evaluation:      Plan of Care Reviewed With: patient    Overall Patient Progress: no changeOverall Patient Progress: no change    Outcome Evaluation: N/V controlled with PRN IV meds, SBA , clear liquid diet.      "

## 2025-02-24 NOTE — H&P
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 pylori, and cannabis hyperemesis syndrome presents with nausea and vomiting suspect due to cannabis hyperemesis syndrome.    PLAN    Cannabis Hyperemesis Syndrome, Recurrent  -Presents with nausea and vomiting in the setting of cannabis use  -Multiple past admissions for this, patient has not quit using cannabis  -Scopolamine patch and as needed antiemetics  -PPI IV  -CLD and advance as tolerated  -Counseled on cannabis cessation    Starvation Ketosis  -Due to nausea and vomiting above  -Will finish current LR infusion than start D5LR@150ml/hr  -Trend labs    Other Issues  -Aspiration pneumonitis: In setting of n/v, no need to treat as pt is asymptomatic  -H Pylori: Completed therapy for this  -?SMA Syndrome: Had suggested CT imaging in the past but recent imaging negative for this    DVT Prophy  -SCDs    Disposition  -Medically Ready for Discharge: Anticipated Tomorrow       Conner Zheng MD    History of Present Illness     Jael Boateng is a 24 year old with 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 pylori, and cannabis hyperemesis syndrome presents with nausea and vomiting.  Symptoms started this morning.  Endorses nausea, vomiting, and crampy abdominal pain with episodes of emesis.  Denies fevers or chills.  A few loose stools.  Denies sick contacts.  Recent admission under similar circumstances and symptoms thought to be secondary to cannabis hyperemesis syndrome.  Also history of H. pylori but completed treatment for this.  In the ED, VSS.  Bicarb of 16 and ketones 2.95.  UDS with cannabis and opioids.  CT abdomen pelvis with evidence of aspiration pneumonitis.  Ultrasound of the abdomen negative.  Admitted to medicine.    Review of Systems     A Comprehensive greater than 10 system review of systems was carried out.  Pertinent positives and negatives are  noted above.  Otherwise negative for contributory information.     Past Medical History     Past Medical History:   Diagnosis Date    Cannabis hyperemesis syndrome concurrent with and due to cannabis abuse (H)      Medications     Current Outpatient Medications   Medication Sig Dispense Refill    capsaicin (ZOSTRIX) 0.025 % external cream Apply 1 Application topically 3 times daily as needed (pain). 25 g 0    famotidine (PEPCID) 20 MG tablet Take 1 tablet (20 mg) by mouth 2 times daily for 14 days. 28 tablet 0    metoclopramide (REGLAN) 10 MG tablet Take 10 mg by mouth every 6 hours as needed (nausea and vomiting.).      ondansetron (ZOFRAN ODT) 4 MG ODT tab Take 1 tablet (4 mg) by mouth every 8 hours as needed for nausea or vomiting. 10 tablet 0    ondansetron (ZOFRAN ODT) 4 MG ODT tab Take 4 mg by mouth 2 times daily.      oxyCODONE (ROXICODONE) 5 MG tablet Take 1 tablet (5 mg) by mouth every 6 hours as needed for moderate pain or severe pain. (Patient not taking: Reported on 2/23/2025) 15 tablet 0    pantoprazole (PROTONIX) 40 MG EC tablet Take 1 tablet (40 mg) by mouth every morning (before breakfast). (Patient not taking: Reported on 2/23/2025) 30 tablet 0    prochlorperazine (COMPAZINE) 10 MG tablet Take 0.5 tablets (5 mg) by mouth every 6 hours as needed for vomiting. 20 tablet 0    senna-docusate (SENOKOT-S/PERICOLACE) 8.6-50 MG tablet Take 1 tablet by mouth 2 times daily as needed for constipation. 30 tablet 0    simethicone (MYLICON) 125 MG chewable tablet Take 1 tablet (125 mg) by mouth 2 times daily as needed for intestinal gas. 30 tablet 0      Past Surgical History   History reviewed. No pertinent surgical history.     Family History   History reviewed. No pertinent family history.    Allergies   No Known Allergies    Social History     Social History     Tobacco Use    Smoking status: Never    Smokeless tobacco: Never   Substance Use Topics    Alcohol use: Not Currently     Physical Exam   Blood  "pressure 127/78, pulse 76, temperature 97  F (36.1  C), temperature source Temporal, resp. rate 16, height 1.575 m (5' 2\"), weight 52.5 kg (115 lb 11.9 oz), SpO2 100%.    General: Ill appearing, cooperative with exam, in NAD.  HEENT: Atraumatic. No erythema in posterior pharynx.  Lymph: No cervical or inguinal lymphadenopathy.  Cardiac: RRR. No murmurs.  Lungs: CTAB. Nl WOB.  Abd: Non-tender. No rebound or gaurding. Nl bowel sounds.  Ext: No edema. 2+ pulses.  Skin: No rashes, abrasions, or contusions.  Psych: A&Ox3. Nl affect.  Neuro: 5/5 strength. Sensation intact.    Labs & Imaging     Reviewed and Pertinent results discussed in assessment and plan.      "

## 2025-02-24 NOTE — PROVIDER NOTIFICATION
critical lab value ketone 2.95   Thanks  Sent to [facility] Hospitalist Dr Millan via [employer]

## 2025-02-24 NOTE — PROGRESS NOTES
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 pylori, and cannabis hyperemesis syndrome presents with nausea and vomiting suspect due to cannabis hyperemesis syndrome.        Cannabis Hyperemesis Syndrome, Recurrent  -Presents with nausea and vomiting in the setting of cannabis use  -Multiple past admissions for this, patient has not quit using cannabis  -Scopolamine patch and as needed antiemetics  -Counseled on cannabis cessation  -cont PPI and await advancement of diet, currently on CLD  -cont fluids until tolerating oral intake     Starvation Ketosis  -Due to nausea and vomiting above  -cont fluids as above and await oral intake     Aspiration pneumonitis  -In setting of n/v, no need to treat as pt is asymptomatic    H Pylori: Completed therapy for this  ?SMA Syndrome: Had suggested CT imaging in the past but recent imaging negative for this       Observation Goals: -diagnostic tests and consults completed and resulted, -vital signs normal or at patient baseline, -tolerating oral intake to maintain hydration, Nurse to notify provider when observation goals have been met and patient is ready for discharge.  Diet: Clear Liquid Diet    DVT Prophylaxis: Pneumatic Compression Devices  Schmidt Catheter: Not present  Lines: None     Cardiac Monitoring: None  Code Status: Full Code        Social Drivers of Health            Disposition Plan     Medically Ready for Discharge: Anticipated Tomorrow             Spenser Emerson DO  Hospitalist Service  [facility]  Securely message with [employer] (more info)  Text page via [employer] Paging/Directory   ______________________________________________________________________    Interval History   Still with persistent nausea although vomiting has stopped. Oral itnake remains poor, tolerating small sips of water only. Had  severe hiccups but now resolved    Physical Exam   Vital Signs: Temp: 97.5  F (36.4  C) Temp src: Oral BP: 105/75 Pulse: 85   Resp: 18 SpO2: 99 % O2 Device: None (Room air)    Weight: 114 lbs 12.8 oz    Constitutional: awake, alert, and cooperative  Respiratory: no increased work of breathing, good air exchange, and clear to auscultation  Cardiovascular: regular rate and rhythm and no murmur noted  GI: normal bowel sounds, soft, and non-distended, mild discomfort to palpation  Skin: no bruising or bleeding  Neurologic: alert, interactive, on focal deficits    Medical Decision Making       45 MINUTES SPENT BY ME on the date of service doing chart review, history, exam, documentation & further activities per the note.      Data   ------------------------- PAST 24 HR DATA REVIEWED -----------------------------------------------    I have personally reviewed the following data over the past 24 hrs:    5.7  \   13.8   / 115 (L)     129 (L) 97 (L) 4.6 (L) /  106 (H)   3.7 18 (L) 0.48 (L) \       Imaging results reviewed over the past 24 hrs:   No results found for this or any previous visit (from the past 24 hours).

## 2025-02-25 VITALS
BODY MASS INDEX: 21.12 KG/M2 | SYSTOLIC BLOOD PRESSURE: 120 MMHG | HEIGHT: 62 IN | WEIGHT: 114.8 LBS | OXYGEN SATURATION: 99 % | TEMPERATURE: 98.8 F | RESPIRATION RATE: 18 BRPM | DIASTOLIC BLOOD PRESSURE: 87 MMHG | HEART RATE: 79 BPM

## 2025-02-25 LAB
ANION GAP SERPL CALCULATED.3IONS-SCNC: 15 MMOL/L (ref 7–15)
BUN SERPL-MCNC: 5.4 MG/DL (ref 6–20)
CALCIUM SERPL-MCNC: 8.6 MG/DL (ref 8.8–10.4)
CHLORIDE SERPL-SCNC: 96 MMOL/L (ref 98–107)
CREAT SERPL-MCNC: 0.46 MG/DL (ref 0.51–0.95)
EGFRCR SERPLBLD CKD-EPI 2021: >90 ML/MIN/1.73M2
GLUCOSE SERPL-MCNC: 96 MG/DL (ref 70–99)
HCO3 SERPL-SCNC: 19 MMOL/L (ref 22–29)
POTASSIUM SERPL-SCNC: 2.8 MMOL/L (ref 3.4–5.3)
SODIUM SERPL-SCNC: 130 MMOL/L (ref 135–145)

## 2025-02-25 PROCEDURE — 80048 BASIC METABOLIC PNL TOTAL CA: CPT | Performed by: HOSPITALIST

## 2025-02-25 PROCEDURE — 82435 ASSAY OF BLOOD CHLORIDE: CPT | Performed by: HOSPITALIST

## 2025-02-25 PROCEDURE — 36415 COLL VENOUS BLD VENIPUNCTURE: CPT | Performed by: HOSPITALIST

## 2025-02-25 PROCEDURE — 250N000009 HC RX 250: Performed by: INTERNAL MEDICINE

## 2025-02-25 PROCEDURE — 250N000013 HC RX MED GY IP 250 OP 250 PS 637: Performed by: HOSPITALIST

## 2025-02-25 PROCEDURE — 258N000003 HC RX IP 258 OP 636: Performed by: HOSPITALIST

## 2025-02-25 PROCEDURE — 250N000013 HC RX MED GY IP 250 OP 250 PS 637: Performed by: INTERNAL MEDICINE

## 2025-02-25 PROCEDURE — 99239 HOSP IP/OBS DSCHRG MGMT >30: CPT | Performed by: HOSPITALIST

## 2025-02-25 PROCEDURE — 250N000011 HC RX IP 250 OP 636: Performed by: INTERNAL MEDICINE

## 2025-02-25 RX ORDER — PROCHLORPERAZINE MALEATE 10 MG
5 TABLET ORAL EVERY 6 HOURS PRN
Qty: 10 TABLET | Refills: 0 | Status: SHIPPED | OUTPATIENT
Start: 2025-02-25

## 2025-02-25 RX ORDER — ONDANSETRON 4 MG/1
4 TABLET, ORALLY DISINTEGRATING ORAL EVERY 8 HOURS PRN
Qty: 10 TABLET | Refills: 0 | Status: SHIPPED | OUTPATIENT
Start: 2025-02-25

## 2025-02-25 RX ORDER — SCOPOLAMINE 1 MG/3D
1 PATCH, EXTENDED RELEASE TRANSDERMAL
Qty: 3 PATCH | Refills: 0 | Status: SHIPPED | OUTPATIENT
Start: 2025-02-25 | End: 2025-02-25

## 2025-02-25 RX ORDER — POTASSIUM CHLORIDE 20MEQ/15ML
40 LIQUID (ML) ORAL DAILY
Qty: 90 ML | Refills: 0 | Status: SHIPPED | OUTPATIENT
Start: 2025-02-25 | End: 2025-02-25

## 2025-02-25 RX ORDER — PROCHLORPERAZINE MALEATE 10 MG
5 TABLET ORAL EVERY 6 HOURS PRN
Qty: 10 TABLET | Refills: 0 | Status: SHIPPED | OUTPATIENT
Start: 2025-02-25 | End: 2025-02-25

## 2025-02-25 RX ORDER — SCOPOLAMINE 1 MG/3D
1 PATCH, EXTENDED RELEASE TRANSDERMAL
Qty: 3 PATCH | Refills: 0 | Status: SHIPPED | OUTPATIENT
Start: 2025-02-25

## 2025-02-25 RX ORDER — POTASSIUM CHLORIDE 20MEQ/15ML
40 LIQUID (ML) ORAL DAILY
Qty: 90 ML | Refills: 0 | Status: SHIPPED | OUTPATIENT
Start: 2025-02-25

## 2025-02-25 RX ORDER — POTASSIUM CHLORIDE 20MEQ/15ML
40 LIQUID (ML) ORAL ONCE
Status: COMPLETED | OUTPATIENT
Start: 2025-02-25 | End: 2025-02-25

## 2025-02-25 RX ADMIN — SODIUM CHLORIDE: 900 INJECTION, SOLUTION INTRAVENOUS at 09:22

## 2025-02-25 RX ADMIN — ONDANSETRON 4 MG: 4 TABLET, ORALLY DISINTEGRATING ORAL at 06:15

## 2025-02-25 RX ADMIN — PANTOPRAZOLE SODIUM 40 MG: 40 INJECTION, POWDER, FOR SOLUTION INTRAVENOUS at 06:08

## 2025-02-25 RX ADMIN — POTASSIUM CHLORIDE 40 MEQ: 1.5 SOLUTION ORAL at 11:04

## 2025-02-25 RX ADMIN — SODIUM CHLORIDE: 900 INJECTION, SOLUTION INTRAVENOUS at 01:04

## 2025-02-25 RX ADMIN — PROCHLORPERAZINE MALEATE 10 MG: 10 TABLET ORAL at 09:36

## 2025-02-25 ASSESSMENT — ACTIVITIES OF DAILY LIVING (ADL)
ADLS_ACUITY_SCORE: 28

## 2025-02-25 NOTE — DISCHARGE SUMMARY
[facility]  Hospitalist Discharge Summary      Date of Admission:  2/23/2025  Date of Discharge:  2/25/2025  Discharging Provider: Spenser Emerson DO  Discharge Service: Hospitalist Service    Discharge Diagnoses   Cannabis hyperemesis syndrome, recurrent  Starvation ketosis, resolved  Hypokalemia  Aspiration pneumonitis  hyponatremia  Hx h pylori      Follow-ups Needed After Discharge   Follow-up Appointments       Follow-up and recommended labs and tests       Follow up with primary care provider, Dasia Travis, within 7 days for hospital follow- up.  No follow up labs or test are needed.                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 pylori, and cannabis hyperemesis syndrome presents with nausea and vomiting suspect due to cannabis hyperemesis syndrome.        Cannabis Hyperemesis Syndrome, Recurrent  -Presents with nausea and vomiting in the setting of cannabis use  -Multiple past admissions for this, patient has not quit using cannabis  -Scopolamine patch and as needed antiemetics  -Counseled on cannabis cessation  -tolerating clear liquids adequately, offered to keep and advance diet but declined and wishes to be discharged as she has work obligations. Discussed staying hydrated with gatorade and other electrlyte containing fluids in addition to water     Hypokalemia  -replete, will continue on dishcarge x3 days    Starvation Ketosis  -Due to nausea and vomiting above, resolved     Aspiration pneumonitis  -In setting of n/v, no need to treat as pt is asymptomatic    Hyponatremia  -improved, cont adequate oral intake    H Pylori: Completed therapy for this  ?SMA Syndrome: Had suggested CT imaging in the past but recent imaging negative for this    Consultations This Hospital Stay   None    Code Status   Full Code    Time Spent on this Encounter   I, Spenser Emerson DO,  personally saw the patient today and spent greater than 30 minutes discharging this patient.       Spenser Emerson DO  [facility]  [address]  [facility] [address]  Phone: [phone]  Fax: [phone]  ______________________________________________________________________    Physical Exam   Vital Signs: Temp: 98.8  F (37.1  C) Temp src: Oral BP: 120/87 Pulse: 79   Resp: 18 SpO2: 99 % O2 Device: None (Room air)    Weight: 114 lbs 12.8 oz  Face to face completed day of discharge       Primary Care Physician   Dasia Travis    Discharge Orders      Reason for your hospital stay    Admitted for marijuana hyperemesis syndrome. Cont to abstain from marijuana. PRN antiemetics scripts ordered. Stay well hydrated and recommend gatorade or electrolyte rich fluids. Complete 3 days of potassium supplementation as well     Follow-up and recommended labs and tests     Follow up with primary care provider, Dasia Travis, within 7 days for hospital follow- up.  No follow up labs or test are needed.     Activity    Your activity upon discharge: activity as tolerated     Diet    Follow this diet upon discharge: Current Diet:Orders Placed This Encounter      Clear Liquid Diet       Significant Results and Procedures   Most Recent 3 CBC's:  Recent Labs   Lab Test 02/24/25  0711 02/23/25  1041 02/21/25  1127   WBC 5.7 5.2 4.2   HGB 13.8 13.7 14.9   MCV 84 85 84   * 123* 121*     Most Recent 3 BMP's:  Recent Labs   Lab Test 02/25/25  0911 02/24/25  0711 02/23/25  1041   * 129* 135   POTASSIUM 2.8* 3.7 3.7   CHLORIDE 96* 97* 103   CO2 19* 18* 16*   BUN 5.4* 4.6* 11.2   CR 0.46* 0.48* 0.63   ANIONGAP 15 14 16*   EVGENY 8.6* 8.9 9.5   GLC 96 106* 117*     Most Recent 2 LFT's:  Recent Labs   Lab Test 02/23/25  1041 02/21/25  1127   AST 23 26   ALT 19 24   ALKPHOS 60 66   BILITOTAL 0.6 0.3     Most Recent 3 INR's:No lab results found.  Most Recent 3 Hemoglobins:  Recent Labs   Lab Test  02/24/25  0711 02/23/25  1041 02/21/25  1127   HGB 13.8 13.7 14.9     Most Recent 3 Troponin's:No lab results found.  Most Recent 3 BNP's:No lab results found.  Most Recent D-dimer:No lab results found.,   Results for orders placed or performed during the hospital encounter of 02/23/25   US Abdomen Limited (RUQ)    Narrative    EXAM: ULTRASOUND ABDOMEN LIMITED  LOCATION: [facility]  DATE: 02/23/2025    INDICATION: Epigastric pain, vomiting.  COMPARISON: CT 09/12/2024.  TECHNIQUE: Limited abdominal ultrasound.    FINDINGS:    GALLBLADDER: Normal. No gallstones, wall thickening, or pericholecystic fluid. Negative sonographic Kang's sign.    BILE DUCTS: No biliary dilatation. The common duct measures 1 mm.    LIVER: Normal parenchyma with smooth contour. No focal mass. The portal vein is patent with flow in the normal direction.    RIGHT KIDNEY: No hydronephrosis.    PANCREAS: The visualized portions are normal.    No ascites.      Impression    IMPRESSION:  1.  Normal limited abdominal ultrasound.       CT Abdomen Pelvis w Contrast    Narrative    EXAM: CT ABDOMEN PELVIS W CONTRAST  LOCATION: [facility]  DATE: 2/23/2025    INDICATION: abdominal pain  COMPARISON: CT angiogram abdomen pelvis September 12, 2024.  TECHNIQUE: CT scan of the abdomen and pelvis was performed following injection of IV contrast. Multiplanar reformats were obtained. Dose reduction techniques were used.  CONTRAST: 58mL isovue 370    FINDINGS:   LOWER CHEST: Patchy groundglass opacities in the partially visualized right middle and lower lobes with secretions and some of the right lower lobe peripheral bronchioles. The left lung base is clear. No pleural effusion.    HEPATOBILIARY: Normal liver, gallbladder, biliary tree.    PANCREAS: Normal.    SPLEEN: Normal.    ADRENAL GLANDS: Normal.    KIDNEYS/BLADDER: Normal appearance of the renal parenchyma. No calculus, hydronephrosis, or perinephric  stranding. Ureters and urinary bladder appear normal.    BOWEL: Normal distal esophagus, stomach, small bowel, large bowel, and appendix.    LYMPH NODES: Normal.    VASCULATURE: Normal.    PELVIC ORGANS: Normal.    MUSCULOSKELETAL: Normal.      Impression    IMPRESSION:   1.  Patchy groundglass opacities in the right middle and lower lobes with secretions in some of the right lower lobe peripheral bronchioles. The appearance is nonspecific but may represent pneumonia or aspiration pneumonitis.  2.  No abnormality identified in the abdomen or pelvis.         Discharge Medications   Discharge Medication List as of 2/25/2025 11:43 AM        CONTINUE these medications which have CHANGED    Details   !! ondansetron (ZOFRAN ODT) 4 MG ODT tab Take 1 tablet (4 mg) by mouth every 8 hours as needed for nausea or vomiting., Disp-10 tablet, R-0, E-Prescribe      potassium chloride (KAYCIEL) 20 MEQ/15ML (10%) solution Take 30 mLs (40 mEq) by mouth daily., Disp-90 mL, R-0, E-Prescribe      prochlorperazine (COMPAZINE) 10 MG tablet Take 0.5 tablets (5 mg) by mouth every 6 hours as needed for vomiting., Disp-10 tablet, R-0, E-Prescribe      scopolamine (TRANSDERM) 1 MG/3DAYS 72 hr patch Place 1 patch over 72 hours onto the skin every 72 hours as needed for vomiting or nausea.Disp-3 patch, [id number]       !! - Potential duplicate medications found. Please discuss with provider.        CONTINUE these medications which have NOT CHANGED    Details   capsaicin (ZOSTRIX) 0.025 % external cream Apply 1 Application topically 3 times daily as needed (pain)., Disp-25 g, R-0, E-Prescribe      famotidine (PEPCID) 20 MG tablet Take 1 tablet (20 mg) by mouth 2 times daily for 14 days., Disp-28 tablet, R-0, E-Prescribe      metoclopramide (REGLAN) 10 MG tablet Take 10 mg by mouth every 6 hours as needed (nausea and vomiting.)., Historical      !! ondansetron (ZOFRAN ODT) 4 MG ODT tab Take 4 mg by mouth 2 times daily., Historical       simethicone (MYLICON) 125 MG chewable tablet Take 1 tablet (125 mg) by mouth 2 times daily as needed for intestinal gas., Disp-30 tablet, R-0, E-Prescribe       !! - Potential duplicate medications found. Please discuss with provider.        STOP taking these medications       oxyCODONE (ROXICODONE) 5 MG tablet Comments:   Reason for Stopping:         pantoprazole (PROTONIX) 40 MG EC tablet Comments:   Reason for Stopping:         senna-docusate (SENOKOT-S/PERICOLACE) 8.6-50 MG tablet Comments:   Reason for Stopping:             Allergies   No Known Allergies

## 2025-02-25 NOTE — PLAN OF CARE
"Goal Outcome Evaluation:     A&Ox4, VSS on RA. Up ad ela. CLD, tolerating some sips. Multiple episodes of vomiting during the day. Has been resting a lot today. PIV intact, 0.9% @125. AM Na level low, 129. No pain.     Plan of Care Reviewed With: patient    Overall Patient Progress: no changeOverall Patient Progress: no change    Outcome Evaluation: IVF continued, continues to have N/V. Sleepy.      Problem: Adult Inpatient Plan of Care  Goal: Plan of Care Review  Description: The Plan of Care Review/Shift note should be completed every shift.  The Outcome Evaluation is a brief statement about your assessment that the patient is improving, declining, or no change.  This information will be displayed automatically on your shift  note.  Outcome: Progressing  Flowsheets (Taken 2/24/2025 1804)  Outcome Evaluation: IVF continued, continues to have N/V. Sleepy.  Plan of Care Reviewed With: patient  Overall Patient Progress: no change  Goal: Patient-Specific Goal (Individualized)  Description: You can add care plan individualizations to a care plan. Examples of Individualization might be:  \"Parent requests to be called daily at 9am for status\", \"I have a hard time hearing out of my right ear\", or \"Do not touch me to wake me up as it startles  me\".  Outcome: Progressing  Goal: Absence of Hospital-Acquired Illness or Injury  Outcome: Progressing  Intervention: Identify and Manage Fall Risk  Recent Flowsheet Documentation  Taken 2/24/2025 1621 by Christina Becerra, RN  Safety Promotion/Fall Prevention:   clutter free environment maintained   lighting adjusted   nonskid shoes/slippers when out of bed   patient and family education   room door open   safety round/check completed   room near nurse's station   room organization consistent   supervised activity  Taken 2/24/2025 0900 by Christina Becerra, RN  Safety Promotion/Fall Prevention:   activity supervised   clutter free environment maintained   lighting adjusted   nonskid " shoes/slippers when out of bed   patient and family education   room door open   safety round/check completed   room near nurse's station   room organization consistent   supervised activity  Taken 2/24/2025 0820 by Christina Becerra RN  Safety Promotion/Fall Prevention:   clutter free environment maintained   lighting adjusted   nonskid shoes/slippers when out of bed   patient and family education   room door open   safety round/check completed   room near nurse's station   room organization consistent   supervised activity  Goal: Optimal Comfort and Wellbeing  Outcome: Progressing  Goal: Readiness for Transition of Care  Outcome: Progressing

## 2025-02-25 NOTE — PLAN OF CARE
"Goal Outcome Evaluation:      Plan of Care Reviewed With: patient    Overall Patient Progress: no changeOverall Patient Progress: no change    Outcome Evaluation: Pt alert and orientated. Lethargic. on RA. Denies pain. One epsidode of emesis. IV compazine given. New IV placed infusing NS @ 125ml. Clear liquid diet. up ind.    Temp: 97.9  F (36.6  C) Temp src: Axillary BP: 111/70 Pulse: 84   Resp: 18 SpO2: 99 % O2 Device: None (Room air)         Problem: Adult Inpatient Plan of Care  Goal: Plan of Care Review  Description: The Plan of Care Review/Shift note should be completed every shift.  The Outcome Evaluation is a brief statement about your assessment that the patient is improving, declining, or no change.  This information will be displayed automatically on your shift  note.  Outcome: Not Progressing  Flowsheets (Taken 2/24/2025 2244)  Outcome Evaluation: Pt alert and orientated. Lethargic. on RA. Denies pain. One epsidode of emesis. IV compazine given. New IV placed infusing NS @ 125ml. Clear liquid diet. up ind.  Plan of Care Reviewed With: patient  Overall Patient Progress: no change  Goal: Patient-Specific Goal (Individualized)  Description: You can add care plan individualizations to a care plan. Examples of Individualization might be:  \"Parent requests to be called daily at 9am for status\", \"I have a hard time hearing out of my right ear\", or \"Do not touch me to wake me up as it startles  me\".  Outcome: Not Progressing  Goal: Absence of Hospital-Acquired Illness or Injury  Outcome: Not Progressing  Intervention: Identify and Manage Fall Risk  Recent Flowsheet Documentation  Taken 2/24/2025 2013 by Juany Reid, RN  Safety Promotion/Fall Prevention:   safety round/check completed   nonskid shoes/slippers when out of bed  Intervention: Prevent Skin Injury  Recent Flowsheet Documentation  Taken 2/24/2025 2013 by Juany Reid RN  Body Position: position changed independently  Intervention: Prevent " Infection  Recent Flowsheet Documentation  Taken 2/24/2025 2013 by Juany Reid, RN  Infection Prevention:   rest/sleep promoted   hand hygiene promoted  Goal: Optimal Comfort and Wellbeing  Outcome: Not Progressing  Goal: Readiness for Transition of Care  Outcome: Not Progressing

## 2025-02-25 NOTE — PLAN OF CARE
"/67   Pulse 89   Temp 97.8  F  Resp 18  SpO2 99%     Patient is alert and oriented x4, no complaints of pain, independent, continent of bowel and bladder, feels much better today just tired and restless, gave zofran x1 at 6am, plan is to discharge today.      Goal Outcome Evaluation:      Plan of Care Reviewed With: patient    Overall Patient Progress: improvingOverall Patient Progress: improving    Outcome Evaluation: Patient is alert and oriented x4, no complaints of pain, independent, continent of bowel and bladder, feels much better today just tired and restless, gave zofran x1 at 6am, plan is to discharge today.      Problem: Adult Inpatient Plan of Care  Goal: Plan of Care Review  Description: The Plan of Care Review/Shift note should be completed every shift.  The Outcome Evaluation is a brief statement about your assessment that the patient is improving, declining, or no change.  This information will be displayed automatically on your shift  note.  Outcome: Progressing  Flowsheets (Taken 2/25/2025 3772)  Outcome Evaluation: Patient is alert and oriented x4, no complaints of pain, independent, continent of bowel and bladder, feels much better today just tired and restless, gave zofran x1 at 6am, plan is to discharge today.  Plan of Care Reviewed With: patient  Overall Patient Progress: improving  Goal: Patient-Specific Goal (Individualized)  Description: You can add care plan individualizations to a care plan. Examples of Individualization might be:  \"Parent requests to be called daily at 9am for status\", \"I have a hard time hearing out of my right ear\", or \"Do not touch me to wake me up as it startles  me\".  Outcome: Progressing  Goal: Absence of Hospital-Acquired Illness or Injury  Outcome: Progressing  Intervention: Identify and Manage Fall Risk  Recent Flowsheet Documentation  Taken 2/24/2025 5261 by Tia Rowland RN  Safety Promotion/Fall Prevention:   clutter free environment maintained   " lighting adjusted   nonskid shoes/slippers when out of bed   patient and family education   room organization consistent   room near nurse's station   safety round/check completed  Intervention: Prevent Skin Injury  Recent Flowsheet Documentation  Taken 2/24/2025 2351 by Tia Rowland RN  Body Position: position changed independently  Intervention: Prevent Infection  Recent Flowsheet Documentation  Taken 2/24/2025 2351 by Tia Rowland, RN  Infection Prevention:   cohorting utilized   hand hygiene promoted   rest/sleep promoted   single patient room provided  Goal: Optimal Comfort and Wellbeing  Outcome: Progressing  Goal: Readiness for Transition of Care  Outcome: Progressing

## 2025-02-25 NOTE — PROGRESS NOTES
"/87 (BP Location: Right arm)   Pulse 79   Temp 98.8  F (37.1  C) (Oral)   Resp 18   Ht 1.575 m (5' 2\")   Wt 52.1 kg (114 lb 12.8 oz)   SpO2 99%   BMI 21.00 kg/m       PIV removed. Belongings returned to patient. Discharge instructions and medications reviewed with patient and family, who verbalized understanding. Patient left hospital in wheelchair with nursing staff, family provided transportation home.  "

## 2025-02-27 ENCOUNTER — PATIENT OUTREACH (OUTPATIENT)
Dept: CARE COORDINATION | Facility: CLINIC | Age: 25
End: 2025-02-27
Payer: COMMERCIAL

## 2025-02-27 NOTE — PROGRESS NOTES
[facility] Care Resource Center Contact  [facility]/Voicemail     Clinical Data: Post-Discharge Outreach     Outreach attempted x 2.  Left message on patient's voicemail, providing [facility]'s central phone number of [phone] ([phone]) for questions/concerns and/or to schedule an appt with an [facility] provider, if they do not have a PCP.      Plan:  [facility] will do no further outreaches at this time.     PO Fitzpatrick  [phone]  [facility]     *Connected Care Resource Team does NOT follow patient ongoing. Referrals are identified based on internal discharge reports and the outreach is to ensure patient has an understanding of their discharge instructions.

## 2025-03-17 ENCOUNTER — OFFICE VISIT (OUTPATIENT)
Dept: OBGYN | Facility: CLINIC | Age: 25
End: 2025-03-17
Attending: NURSE PRACTITIONER
Payer: COMMERCIAL

## 2025-03-17 VITALS — WEIGHT: 121.8 LBS | BODY MASS INDEX: 22.28 KG/M2 | SYSTOLIC BLOOD PRESSURE: 108 MMHG | DIASTOLIC BLOOD PRESSURE: 66 MMHG

## 2025-03-17 DIAGNOSIS — Z11.3 SCREENING EXAMINATION FOR STI: ICD-10-CM

## 2025-03-17 DIAGNOSIS — N92.1 METRORRHAGIA: ICD-10-CM

## 2025-03-17 DIAGNOSIS — N94.6 DYSMENORRHEA: Primary | ICD-10-CM

## 2025-03-17 LAB — T PALLIDUM AB SER QL: NONREACTIVE

## 2025-03-17 PROCEDURE — 86780 TREPONEMA PALLIDUM: CPT

## 2025-03-17 PROCEDURE — 87389 HIV-1 AG W/HIV-1&-2 AB AG IA: CPT

## 2025-03-17 PROCEDURE — 36415 COLL VENOUS BLD VENIPUNCTURE: CPT

## 2025-03-17 PROCEDURE — 99203 OFFICE O/P NEW LOW 30 MIN: CPT

## 2025-03-17 PROCEDURE — 3078F DIAST BP <80 MM HG: CPT

## 2025-03-17 PROCEDURE — 3074F SYST BP LT 130 MM HG: CPT

## 2025-03-17 PROCEDURE — 87491 CHLMYD TRACH DNA AMP PROBE: CPT

## 2025-03-17 PROCEDURE — 86803 HEPATITIS C AB TEST: CPT

## 2025-03-17 PROCEDURE — 87591 N.GONORRHOEAE DNA AMP PROB: CPT

## 2025-03-17 ASSESSMENT — ANXIETY QUESTIONNAIRES
GAD7 TOTAL SCORE: 2
GAD7 TOTAL SCORE: 2
6. BECOMING EASILY ANNOYED OR IRRITABLE: SEVERAL DAYS
7. FEELING AFRAID AS IF SOMETHING AWFUL MIGHT HAPPEN: NOT AT ALL
7. FEELING AFRAID AS IF SOMETHING AWFUL MIGHT HAPPEN: NOT AT ALL
2. NOT BEING ABLE TO STOP OR CONTROL WORRYING: NOT AT ALL
4. TROUBLE RELAXING: NOT AT ALL
3. WORRYING TOO MUCH ABOUT DIFFERENT THINGS: NOT AT ALL
IF YOU CHECKED OFF ANY PROBLEMS ON THIS QUESTIONNAIRE, HOW DIFFICULT HAVE THESE PROBLEMS MADE IT FOR YOU TO DO YOUR WORK, TAKE CARE OF THINGS AT HOME, OR GET ALONG WITH OTHER PEOPLE: SOMEWHAT DIFFICULT
8. IF YOU CHECKED OFF ANY PROBLEMS, HOW DIFFICULT HAVE THESE MADE IT FOR YOU TO DO YOUR WORK, TAKE CARE OF THINGS AT HOME, OR GET ALONG WITH OTHER PEOPLE?: SOMEWHAT DIFFICULT
GAD7 TOTAL SCORE: 2
1. FEELING NERVOUS, ANXIOUS, OR ON EDGE: SEVERAL DAYS
5. BEING SO RESTLESS THAT IT IS HARD TO SIT STILL: NOT AT ALL

## 2025-03-17 ASSESSMENT — PATIENT HEALTH QUESTIONNAIRE - PHQ9
SUM OF ALL RESPONSES TO PHQ QUESTIONS 1-9: 3
SUM OF ALL RESPONSES TO PHQ QUESTIONS 1-9: 3
10. IF YOU CHECKED OFF ANY PROBLEMS, HOW DIFFICULT HAVE THESE PROBLEMS MADE IT FOR YOU TO DO YOUR WORK, TAKE CARE OF THINGS AT HOME, OR GET ALONG WITH OTHER PEOPLE: SOMEWHAT DIFFICULT

## 2025-03-17 NOTE — PROGRESS NOTES
SUBJECTIVE:                                                   Jael Boateng is a 24 year old female who presents to clinic today for the following health issue(s):  Patient presents with:  Vaginal Problem: Patient is in for painful menstruation concerns. Pain is at a level 12. Diagnosed with SMA and the periods became worse. Flow is reg, no blood clots. Pain does not get better with pain meds when she is on cycle. Cycles are typically 5 days. Pain starts the week before and until the 3rd day of period.       HPI:  Jael is a 25 yo female with PMHx superior mesenteric artery syndrome, and cannabinoid hyperemesis who presents with her boyfriend to discuss severe dysmenorrhea.     She reports that her periods occur monthly, lasting 4 days at a time with moderate flow, and that from day before menses until day 2 of menses she has sharp 12/10 pain from midline pelvis radiating toward her low back. She has tried use of heat packs, NSAIDs, TENS unit without improvement. Pain keeps her from participating in daily activities.     She is interested in options to help with her symptoms.     She denies tobacco use, migraine with aura, hx of blood clots or bleeding/clotting disorder, breast cancer, liver disease, family history of clotting disorder.     Accepts STI screening.    Patient's last menstrual period was 2025 (exact date)..       Patient is sexually active, .  Using none for contraception.    reports that she has never smoked. She has never used smokeless tobacco.    STD testing offered?  Accepted    Health maintenance updated:  yes    Today's PHQ-2 Score:       3/17/2025     2:06 PM   PHQ-2 (  [employer])   Q1: Little interest or pleasure in doing things 0   Q2: Feeling down, depressed or hopeless 0   PHQ-2 Score 0    Q1: Little interest or pleasure in doing things Not at all   Q2: Feeling down, depressed or hopeless Not at all   PHQ-2 Score 0       Patient-reported     Today's PHQ-9 Score:        3/17/2025     1:56 PM   PHQ-9 SCORE   PHQ-9 Total Score MyChart 3 (Minimal depression)   PHQ-9 Total Score 3        Patient-reported     Today's LINDA-7 Score:       3/17/2025     2:05 PM   LINDA-7 SCORE   Total Score 2 (minimal anxiety)   Total Score 2        Patient-reported       Problem list and histories reviewed & adjusted, as indicated.  Additional history: as documented.    Patient Active Problem List   Diagnosis    Hypokalemia    Nausea and vomiting, unspecified vomiting type    Intractable nausea and vomiting    Cannabinoid hyperemesis syndrome     History reviewed. No pertinent surgical history.   Social History     Tobacco Use    Smoking status: Never    Smokeless tobacco: Never   Substance Use Topics    Alcohol use: Not Currently      No data available              Current Outpatient Medications   Medication Sig Dispense Refill    capsaicin (ZOSTRIX) 0.025 % external cream Apply 1 Application topically 3 times daily as needed (pain). 25 g 0    metoclopramide (REGLAN) 10 MG tablet Take 10 mg by mouth every 6 hours as needed (nausea and vomiting.).      ondansetron (ZOFRAN ODT) 4 MG ODT tab Take 1 tablet (4 mg) by mouth every 8 hours as needed for nausea or vomiting. 10 tablet 0    prochlorperazine (COMPAZINE) 10 MG tablet Take 0.5 tablets (5 mg) by mouth every 6 hours as needed for vomiting. 10 tablet 0    scopolamine (TRANSDERM) 1 MG/3DAYS 72 hr patch Place 1 patch over 72 hours onto the skin every 72 hours as needed for vomiting or nausea. 3 patch 0    simethicone (MYLICON) 125 MG chewable tablet Take 1 tablet (125 mg) by mouth 2 times daily as needed for intestinal gas. 30 tablet 0    ondansetron (ZOFRAN ODT) 4 MG ODT tab Take 4 mg by mouth 2 times daily. (Patient not taking: Reported on 3/17/2025)      potassium chloride (KAYCIEL) 20 MEQ/15ML (10%) solution Take 30 mLs (40 mEq) by mouth daily. (Patient not taking: Reported on 3/17/2025) 90 mL 0     No current facility-administered medications for this  visit.     No Known Allergies    OBJECTIVE:     /66   Wt 55.2 kg (121 lb 12.8 oz)   LMP 02/26/2025 (Exact Date)   BMI 22.28 kg/m    Body mass index is 22.28 kg/m .    Exam:  Constitutional:  Appearance: Well nourished, well developed alert, in no acute distress  Neurologic:  Mental Status:  Oriented X3.  Normal strength and tone, sensory exam grossly normal, mentation intact and speech normal.    Psychiatric:  Mentation appears normal and affect normal/bright.     In-Clinic Test Results:  No results found for this or any previous visit (from the past 24 hours).    ASSESSMENT/PLAN:                                                        ICD-10-CM    1. Screening examination for STI  Z11.3 Chlamydia & Gonorrhea by PCR, [facility] - Clinic Collect     HIV Antigen Antibody Combo     Treponema Abs w Reflex to RPR and Titer     Hepatitis C Screen Reflex to HCV RNA Quant and Genotype     HIV Antigen Antibody Combo     Treponema Abs w Reflex to RPR and Titer     Hepatitis C Screen Reflex to HCV RNA Quant and Genotype      2. Metrorrhagia  N92.1 Ob/Gyn  Referral          Patient Instructions   Thank you for visiting the [facility].    Today we discussed the birth control patch. You should use one patch per week (remove and replace on the same day each week) x 3 weeks. On the 4th week remove your patch and do not replace x 1 week. During your no patch week, you will have your period.     Please do not hesitate to contact the office if you have any questions or concerns.     Jael is a 23 yo female with PMHx superior mesenteric artery syndrome, and cannabinoid hyperemesis who presents with her boyfriend to discuss severe dysmenorrhea.     Dysmenorrhea  - continue conservative measures (heat packs, tub soaks, well timed NSAIDs)   - discussed options for hormone cycle control medications. We discussed birth control pills, the patch, the NuvaRing, Depo-Provera, Nexplanon, Mirena, Keli,  and Kyleena IUDs.  We reviewed the risks, benefits, side effects, failure rates, and potential complications of each of these options for birth control.   We reviewed the insertion process of the Nexplanon and IUDs.  We reviewed the effectiveness of each of the options.  We discussed that none of these protects against STI's. After our discussion patient has decided on the birth control patch  - Rx for patch sent to patient pharmacy, she will contact with office with any concerns    STI Screening  - G/C via urine   - HIV, Hep C, Treponema via blood  - results via mychart when complete     ADDENDUM: patient reports high cost of birth control patch, open to OCP as alternative. Rx sent to pharmacy.    Moni Hill PA-C  [facility]    Answers submitted by the patient for this visit:  Patient Health Questionnaire (Submitted on 3/17/2025)  If you checked off any problems, how difficult have these problems made it for you to do your work, take care of things at home, or get along with other people?: Somewhat difficult  PHQ9 TOTAL SCORE: 3  Patient Health Questionnaire (G7) (Submitted on 3/17/2025)  LINDA 7 TOTAL SCORE: 2

## 2025-03-17 NOTE — PATIENT INSTRUCTIONS
Thank you for visiting the [facility].    Today we discussed the birth control patch. You should use one patch per week (remove and replace on the same day each week) x 3 weeks. On the 4th week remove your patch and do not replace x 1 week. During your no patch week, you will have your period.     Please do not hesitate to contact the office if you have any questions or concerns.

## 2025-03-18 ENCOUNTER — TELEPHONE (OUTPATIENT)
Dept: OBGYN | Facility: CLINIC | Age: 25
End: 2025-03-18
Payer: COMMERCIAL

## 2025-03-18 LAB
C TRACH DNA SPEC QL PROBE+SIG AMP: NEGATIVE
HCV AB SERPL QL IA: NONREACTIVE
HIV 1+2 AB+HIV1 P24 AG SERPL QL IA: NONREACTIVE
N GONORRHOEA DNA SPEC QL NAA+PROBE: NEGATIVE
SPECIMEN TYPE: NORMAL

## 2025-03-18 RX ORDER — DROSPIRENONE AND ETHINYL ESTRADIOL 0.02-3(28)
1 KIT ORAL DAILY
Qty: 84 TABLET | Refills: 4 | Status: SHIPPED | OUTPATIENT
Start: 2025-03-18

## 2025-03-18 NOTE — TELEPHONE ENCOUNTER
3/17/2025 OV w TY ZHONG  Levonorgestrel-Eth Estradiol 120-30 MCG/24HR PTWK     Rx is too expensive over $100 and asking for an alternative     Routing to TY Hill to address.    Can call patient w response before 1245 or after 430p    Tika Resnediz, RN on 3/18/2025 at 12:12 PM

## 2025-06-09 ENCOUNTER — HOSPITAL ENCOUNTER (EMERGENCY)
Facility: CLINIC | Age: 25
Discharge: HOME | End: 2025-06-09
Attending: EMERGENCY MEDICINE | Admitting: EMERGENCY MEDICINE
Payer: COMMERCIAL

## 2025-06-09 VITALS
DIASTOLIC BLOOD PRESSURE: 72 MMHG | SYSTOLIC BLOOD PRESSURE: 123 MMHG | HEART RATE: 79 BPM | HEIGHT: 62 IN | WEIGHT: 130.51 LBS | BODY MASS INDEX: 24.02 KG/M2 | RESPIRATION RATE: 17 BRPM | TEMPERATURE: 98.4 F | OXYGEN SATURATION: 100 %

## 2025-06-09 DIAGNOSIS — R10.9 ABDOMINAL DISCOMFORT: ICD-10-CM

## 2025-06-09 DIAGNOSIS — N92.6 VARIABLE MENSTRUAL CYCLE: ICD-10-CM

## 2025-06-09 DIAGNOSIS — F12.90 MARIJUANA USE: ICD-10-CM

## 2025-06-09 DIAGNOSIS — R10.13 DYSPEPSIA: ICD-10-CM

## 2025-06-09 LAB
ALBUMIN SERPL BCG-MCNC: 4.6 G/DL (ref 3.5–5.2)
ALBUMIN UR-MCNC: NEGATIVE MG/DL
ALP SERPL-CCNC: 61 U/L (ref 40–150)
ALT SERPL W P-5'-P-CCNC: 11 U/L (ref 0–50)
ANION GAP SERPL CALCULATED.3IONS-SCNC: 13 MMOL/L (ref 7–15)
APPEARANCE UR: CLEAR
AST SERPL W P-5'-P-CCNC: 19 U/L (ref 0–45)
BASOPHILS # BLD AUTO: 0 10E3/UL (ref 0–0.2)
BASOPHILS NFR BLD AUTO: 0 %
BILIRUB SERPL-MCNC: 0.3 MG/DL
BILIRUB UR QL STRIP: NEGATIVE
BUN SERPL-MCNC: 4.3 MG/DL (ref 6–20)
CALCIUM SERPL-MCNC: 9.2 MG/DL (ref 8.8–10.4)
CHLORIDE SERPL-SCNC: 103 MMOL/L (ref 98–107)
COLOR UR AUTO: ABNORMAL
CREAT SERPL-MCNC: 0.71 MG/DL (ref 0.51–0.95)
EGFRCR SERPLBLD CKD-EPI 2021: >90 ML/MIN/1.73M2
EOSINOPHIL # BLD AUTO: 0 10E3/UL (ref 0–0.7)
EOSINOPHIL NFR BLD AUTO: 0 %
ERYTHROCYTE [DISTWIDTH] IN BLOOD BY AUTOMATED COUNT: 11.9 % (ref 10–15)
GLUCOSE SERPL-MCNC: 113 MG/DL (ref 70–99)
GLUCOSE UR STRIP-MCNC: NEGATIVE MG/DL
HCG UR QL: NEGATIVE
HCO3 SERPL-SCNC: 21 MMOL/L (ref 22–29)
HCT VFR BLD AUTO: 44.2 % (ref 35–47)
HGB BLD-MCNC: 15.1 G/DL (ref 11.7–15.7)
HGB UR QL STRIP: NEGATIVE
IMM GRANULOCYTES # BLD: 0 10E3/UL
IMM GRANULOCYTES NFR BLD: 0 %
KETONES UR STRIP-MCNC: NEGATIVE MG/DL
LEUKOCYTE ESTERASE UR QL STRIP: NEGATIVE
LIPASE SERPL-CCNC: 20 U/L (ref 13–60)
LYMPHOCYTES # BLD AUTO: 1.1 10E3/UL (ref 0.8–5.3)
LYMPHOCYTES NFR BLD AUTO: 20 %
MCH RBC QN AUTO: 30.6 PG (ref 26.5–33)
MCHC RBC AUTO-ENTMCNC: 34.2 G/DL (ref 31.5–36.5)
MCV RBC AUTO: 90 FL (ref 78–100)
MONOCYTES # BLD AUTO: 0.2 10E3/UL (ref 0–1.3)
MONOCYTES NFR BLD AUTO: 4 %
NEUTROPHILS # BLD AUTO: 3.9 10E3/UL (ref 1.6–8.3)
NEUTROPHILS NFR BLD AUTO: 75 %
NITRATE UR QL: NEGATIVE
NRBC # BLD AUTO: 0 10E3/UL
NRBC BLD AUTO-RTO: 0 /100
PH UR STRIP: 6 [PH] (ref 5–7)
PLATELET # BLD AUTO: 168 10E3/UL (ref 150–450)
POTASSIUM SERPL-SCNC: 4 MMOL/L (ref 3.4–5.3)
PROT SERPL-MCNC: 7.7 G/DL (ref 6.4–8.3)
RBC # BLD AUTO: 4.93 10E6/UL (ref 3.8–5.2)
RBC URINE: 0 /HPF
SODIUM SERPL-SCNC: 137 MMOL/L (ref 135–145)
SP GR UR STRIP: 1.01 (ref 1–1.03)
SQUAMOUS EPITHELIAL: 13 /HPF
UROBILINOGEN UR STRIP-MCNC: NORMAL MG/DL
WBC # BLD AUTO: 5.2 10E3/UL (ref 4–11)
WBC URINE: 1 /HPF

## 2025-06-09 PROCEDURE — 82040 ASSAY OF SERUM ALBUMIN: CPT | Performed by: EMERGENCY MEDICINE

## 2025-06-09 PROCEDURE — 99283 EMERGENCY DEPT VISIT LOW MDM: CPT

## 2025-06-09 PROCEDURE — 81025 URINE PREGNANCY TEST: CPT | Performed by: EMERGENCY MEDICINE

## 2025-06-09 PROCEDURE — 85025 COMPLETE CBC W/AUTO DIFF WBC: CPT | Performed by: EMERGENCY MEDICINE

## 2025-06-09 PROCEDURE — 36415 COLL VENOUS BLD VENIPUNCTURE: CPT | Performed by: EMERGENCY MEDICINE

## 2025-06-09 PROCEDURE — 83690 ASSAY OF LIPASE: CPT | Performed by: EMERGENCY MEDICINE

## 2025-06-09 PROCEDURE — 81001 URINALYSIS AUTO W/SCOPE: CPT | Performed by: EMERGENCY MEDICINE

## 2025-06-09 RX ORDER — ONDANSETRON 4 MG/1
4 TABLET, ORALLY DISINTEGRATING ORAL EVERY 6 HOURS PRN
Qty: 10 TABLET | Refills: 0 | Status: SHIPPED | OUTPATIENT
Start: 2025-06-09 | End: 2025-06-12

## 2025-06-09 ASSESSMENT — ACTIVITIES OF DAILY LIVING (ADL)
ADLS_ACUITY_SCORE: 52

## 2025-06-09 ASSESSMENT — COLUMBIA-SUICIDE SEVERITY RATING SCALE - C-SSRS
6. HAVE YOU EVER DONE ANYTHING, STARTED TO DO ANYTHING, OR PREPARED TO DO ANYTHING TO END YOUR LIFE?: NO
1. IN THE PAST MONTH, HAVE YOU WISHED YOU WERE DEAD OR WISHED YOU COULD GO TO SLEEP AND NOT WAKE UP?: NO
2. HAVE YOU ACTUALLY HAD ANY THOUGHTS OF KILLING YOURSELF IN THE PAST MONTH?: NO

## 2025-06-09 NOTE — Clinical Note
Jael Boateng was seen and treated in our emergency department on 6/9/2025.  She may return to work on 06/13/2025.       If you have any questions or concerns, please don't hesitate to call.      Scott Adam MD

## 2025-06-09 NOTE — ED PROVIDER NOTES
"    History     Chief Complaint:  Abdominal Pain       HPI   Jael Boateng is a 24 year old female here with general stomach upset no vomiting no diarrhea no constipation also started vaginal spotting 2 days ago this would be her week for her menstrual cycle.  This is complicated by she had stopped smoking weed after previous admission but started again last week with left use last night.  Also wanted a pregnancy test because she had unprotected cyst in the recent past and with the vaginal spotting just to double check no vaginal discharge no lower pelvic pain no fever no chills no cough no chest pain no shortness of breath.      Independent Historian:        Review of External Notes:  Discharge summary 2/25/2025 history of SMA syndrome H. pylori chronic epigastric pain and cyclic vomiting related to cannabis use    Medications:    ondansetron (ZOFRAN ODT) 4 MG ODT tab  capsaicin (ZOSTRIX) 0.025 % external cream  drospirenone-ethinyl estradiol (TAVO) 3-0.02 MG tablet  metoclopramide (REGLAN) 10 MG tablet  ondansetron (ZOFRAN ODT) 4 MG ODT tab  ondansetron (ZOFRAN ODT) 4 MG ODT tab  potassium chloride (KAYCIEL) 20 MEQ/15ML (10%) solution  prochlorperazine (COMPAZINE) 10 MG tablet  scopolamine (TRANSDERM) 1 MG/3DAYS 72 hr patch  simethicone (MYLICON) 125 MG chewable tablet        Past Medical History:    Past Medical History:   Diagnosis Date    Cannabis hyperemesis syndrome concurrent with and due to cannabis abuse (H)        Past Surgical History:    No past surgical history on file.       Physical Exam   Patient Vitals for the past 24 hrs:   BP Temp Temp src Pulse Resp SpO2 Height Weight   06/09/25 1159 119/76 -- -- 98 -- 99 % -- --   06/09/25 1116 (!) 117/94 98.4  F (36.9  C) Oral (!) 130 17 100 % -- --   06/09/25 1112 -- -- -- -- -- -- 1.575 m (5' 2\") 59.2 kg (130 lb 8.2 oz)        Physical Exam  General: Patient is well appearing. No distress.  Head: Atraumatic.  Eyes: Conjunctivae and EOM are normal. No " scleral icterus.  Neck: Normal range of motion. Neck supple.   Cardiovascular: Normal rate, regular rhythm, normal heart sounds and intact distal pulses.   Pulmonary/Chest: Breath sounds normal. No respiratory distress.  Abdominal: Soft. Bowel sounds are normal. No distension. No tenderness. No rebound or guarding.   Musculoskeletal: Normal range of motion.  Skin: Warm and dry. No rash noted. Not diaphoretic.      Emergency Department Course   ECG      Imaging:  No orders to display       Laboratory:  Labs Ordered and Resulted from Time of ED Arrival to Time of ED Departure   ROUTINE UA WITH MICROSCOPIC REFLEX TO CULTURE - Abnormal       Result Value    Color Urine Light Yellow      Appearance Urine Clear      Glucose Urine Negative      Bilirubin Urine Negative      Ketones Urine Negative      Specific Gravity Urine 1.006      Blood Urine Negative      pH Urine 6.0      Protein Albumin Urine Negative      Urobilinogen Urine Normal      Nitrite Urine Negative      Leukocyte Esterase Urine Negative      RBC Urine 0      WBC Urine 1      Squamous Epithelials Urine 13 (*)    COMPREHENSIVE METABOLIC PANEL - Abnormal    Sodium 137      Potassium 4.0      Carbon Dioxide (CO2) 21 (*)     Anion Gap 13      Urea Nitrogen 4.3 (*)     Creatinine 0.71      GFR Estimate >90      Calcium 9.2      Chloride 103      Glucose 113 (*)     Alkaline Phosphatase 61      AST 19      ALT 11      Protein Total 7.7      Albumin 4.6      Bilirubin Total 0.3     HCG QUALITATIVE URINE - Normal    hCG Urine Qualitative Negative     LIPASE - Normal    Lipase 20     CBC WITH PLATELETS AND DIFFERENTIAL    WBC Count 5.2      RBC Count 4.93      Hemoglobin 15.1      Hematocrit 44.2      MCV 90      MCH 30.6      MCHC 34.2      RDW 11.9      Platelet Count 168      % Neutrophils 75      % Lymphocytes 20      % Monocytes 4      % Eosinophils 0      % Basophils 0      % Immature Granulocytes 0      NRBCs per 100 WBC 0      Absolute Neutrophils 3.9       Absolute Lymphocytes 1.1      Absolute Monocytes 0.2      Absolute Eosinophils 0.0      Absolute Basophils 0.0      Absolute Immature Granulocytes 0.0      Absolute NRBCs 0.0          Procedures       Emergency Department Course & Assessments:    Interventions:  Medications - No data to display     Assessments:      Independent Interpretation (X-rays, CTs, rhythm strip):      Consultations/Discussion of Management or Tests:         Social Drivers of Health affecting care:       Disposition:  The patient was discharged.    Impression & Plan           Medical Decision Making:  Patient here with abdominal discomfort no gallo abdominal pain no red flags on exam normal vital signs resumed marijuana use and stopped again last night has a history of hyperemesis cannabinoid use as well as dyspepsia H. pylori in the past and recurrent epigastric pain.  She is not having any nausea or vomiting at this time she is able to tolerate p.o. vital signs are normal CBC and differential without signs of infection inflammation or bleeding CMP and lipase as well reassuring her pregnancy test is negative and urine shows no signs of infection dehydration and there is no metabolic derangement overall discussed the need for follow-up with PCP and strict return instructions for here also discussed nicely trying to cease marijuana use.  She is very frustrated but at this time she has no red flags for advanced imaging and discussed that seeing her PCP for possible GI referral again for endoscopy as it has been more than a year may be a appropriate next step    Diagnosis:    ICD-10-CM    1. Abdominal discomfort  R10.9       2. Variable menstrual cycle  N92.6       3. Dyspepsia  R10.13       4. Marijuana use  F12.90            Discharge Medications:  New Prescriptions    ONDANSETRON (ZOFRAN ODT) 4 MG ODT TAB    Take 1 tablet (4 mg) by mouth every 6 hours as needed for nausea or vomiting.            6/9/2025   Scott Adam MD               Scott Adam MD  06/09/25 1414       Scott Adam MD  06/09/25 3317

## 2025-06-09 NOTE — ED TRIAGE NOTES
Pt arrives with abdominal pain that started a few days pta, states spotting that started 2 days ago. Had unprotected sex 1 months ago and took plan B, states possibility of pregnancy. Denies any abd hx. States she is concerned that her eyes and skin are a yellow color and wants to check for jaundice.

## 2025-07-17 ENCOUNTER — TELEPHONE (OUTPATIENT)
Dept: PEDIATRICS | Facility: CLINIC | Age: 25
End: 2025-07-17
Payer: COMMERCIAL

## 2025-07-17 NOTE — TELEPHONE ENCOUNTER
"RN received request from Dr. Martinez regarding scheduled Virtual visit tomorrow 7/18/25. Provider inquiring if patient should be seen in-person rather than virtually for symptoms.     Patient is scheduled for Virtual visit for following reason: \"From my previous appointments, I explained the GI pains I was getting, I was asked to see an OBGYN and after the visit, birth control pills were not working for me\"     RN attempted to reach patient, left voicemail to call back and speak with any triage nurse. Upon call back - please inquire on patient's current symptoms. Any new/worsening symptoms? Determine if patient ok with Virtual visit with possibility of being told they will need to reschedule to be seen in person depending upon discussion with provider.         Darrin NG RN 7/17/2025 at 5:20 PM          "

## 2025-07-17 NOTE — CONFIDENTIAL NOTE
"RN performed chart review for context:     -Patient seen in ED 2/21/25 for vomiting, diarrhea, and epigastric pain. Per notes: Labs are reassuring and after symptomatic medications, she is feeling improved and tolerates PO. Plan for discharge with H2 blocker due to reported PPI intolerance, as needed simethicone, as needed Zofran, follow-up in primary care or gastroenterology for ongoing evaluation. Return precautions discussed.     -Patient admitted to hospital 2/23/25-2/25/25 r/t hypokalemia, nausea, vomiting, cannabis hyperemesis syndrome (recurrent).     -Patient seen for hospital follow up Virtual visit w/ PCP 3/7/25. Patient advised to abstain from cannabis and was referred to OB/GYN regarding painful menses. Patient was also advised to follow up with [facility] regarding possible SMA.     -Patient seen by OB/GYN 3/17/25. Patient prescribed birth control patches, but due to high cost, was prescribed oral drospirenone-ethinyl estradiol (TAVO) 3-0.02 MG tablet daily. See MyC encounter 3/18/25, patient voiced wanting to try alternative birth control option other than oral contraception, was going to wait to get patches in 2 weeks. Unsure if patient using birth control patches at this time.      -Patient seen in ED 6/9/25 r/t abdominal discomfort. \"HPI   Jael Boateng is a 24 year old female here with general stomach upset no vomiting no diarrhea no constipation also started vaginal spotting 2 days ago this would be her week for her menstrual cycle.  This is complicated by she had stopped smoking weed after previous admission but started again last week with left use last night.  Also wanted a pregnancy test because she had unprotected cyst in the recent past and with the vaginal spotting just to double check no vaginal discharge no lower pelvic pain no fever no chills no cough no chest pain no shortness of breath.\" Per notes from ED, recommendation was to follow up w/ PCP for possible referral to GI again for " endoscopy as it has betty more than a year since last imaging.

## 2025-07-21 ENCOUNTER — LAB (OUTPATIENT)
Dept: LAB | Facility: CLINIC | Age: 25
End: 2025-07-21
Payer: COMMERCIAL

## 2025-07-21 DIAGNOSIS — N93.8 DUB (DYSFUNCTIONAL UTERINE BLEEDING): ICD-10-CM

## 2025-07-21 PROCEDURE — 84146 ASSAY OF PROLACTIN: CPT

## 2025-07-21 PROCEDURE — 83001 ASSAY OF GONADOTROPIN (FSH): CPT

## 2025-07-21 PROCEDURE — 36415 COLL VENOUS BLD VENIPUNCTURE: CPT

## 2025-07-21 PROCEDURE — 83002 ASSAY OF GONADOTROPIN (LH): CPT

## 2025-07-21 PROCEDURE — 82670 ASSAY OF TOTAL ESTRADIOL: CPT

## 2025-07-21 PROCEDURE — 84443 ASSAY THYROID STIM HORMONE: CPT

## 2025-07-22 LAB
ESTRADIOL SERPL-MCNC: 30 PG/ML
FSH SERPL IRP2-ACNC: 6.4 MIU/ML
LH SERPL-ACNC: 9.2 MIU/ML
PROLACTIN SERPL 3RD IS-MCNC: 28 NG/ML (ref 5–23)
TSH SERPL DL<=0.005 MIU/L-ACNC: 2.84 UIU/ML (ref 0.3–4.2)

## 2025-08-10 ENCOUNTER — HEALTH MAINTENANCE LETTER (OUTPATIENT)
Age: 25
End: 2025-08-10